# Patient Record
Sex: MALE | Race: WHITE | Employment: FULL TIME | URBAN - METROPOLITAN AREA
[De-identification: names, ages, dates, MRNs, and addresses within clinical notes are randomized per-mention and may not be internally consistent; named-entity substitution may affect disease eponyms.]

---

## 2017-04-04 ENCOUNTER — OFFICE VISIT (OUTPATIENT)
Dept: FAMILY MEDICINE CLINIC | Facility: CLINIC | Age: 57
End: 2017-04-04

## 2017-04-04 VITALS
DIASTOLIC BLOOD PRESSURE: 70 MMHG | WEIGHT: 167 LBS | BODY MASS INDEX: 24 KG/M2 | HEART RATE: 70 BPM | SYSTOLIC BLOOD PRESSURE: 108 MMHG | OXYGEN SATURATION: 97 %

## 2017-04-04 DIAGNOSIS — M25.561 ACUTE PAIN OF RIGHT KNEE: Primary | ICD-10-CM

## 2017-04-04 PROBLEM — E78.00 HYPERCHOLESTEREMIA: Status: ACTIVE | Noted: 2017-04-04

## 2017-04-04 PROCEDURE — 99213 OFFICE O/P EST LOW 20 MIN: CPT

## 2017-04-04 RX ORDER — METHYLPREDNISOLONE 4 MG/1
TABLET ORAL
Qty: 1 KIT | Refills: 0 | Status: SHIPPED | OUTPATIENT
Start: 2017-04-04 | End: 2017-06-08 | Stop reason: ALTCHOICE

## 2017-04-04 RX ORDER — NAPROXEN 500 MG/1
500 TABLET ORAL 2 TIMES DAILY WITH MEALS
Qty: 60 TABLET | Refills: 0 | Status: SHIPPED | OUTPATIENT
Start: 2017-04-04 | End: 2017-05-04

## 2017-04-04 NOTE — PATIENT INSTRUCTIONS
Cómo reducir el dolor y la hinchazón de la rodilla    Hay muchos tratamientos que pueden ayudarle a reducir el dolor y la hinchazón de la rodilla.  Eckert proveedor de [employer] o fisioterapeuta podría sugerirle linnette o más de los tratamientos siguientes:

## 2017-04-04 NOTE — PROGRESS NOTES
HPI:    Patient ID: Ayo Li is a 56year old male. Knee Pain   The pain is present in the right knee. This is a new problem. Episode onset: 2 weeks ago. There has been no history of extremity trauma. The problem occurs constantly.  The problem well-nourished. No distress. Musculoskeletal:        Right knee: He exhibits decreased range of motion (2/2 pain and crepitus present), swelling (mild), effusion (mild) and abnormal meniscus. He exhibits no erythema. Tenderness found.  Medial joint line a

## 2017-04-17 ENCOUNTER — MED REC SCAN ONLY (OUTPATIENT)
Dept: FAMILY MEDICINE CLINIC | Facility: CLINIC | Age: 57
End: 2017-04-17

## 2017-04-25 ENCOUNTER — TELEPHONE (OUTPATIENT)
Dept: FAMILY MEDICINE CLINIC | Facility: CLINIC | Age: 57
End: 2017-04-25

## 2017-04-25 NOTE — TELEPHONE ENCOUNTER
Xray results were reviewed by Dr Dany Ortega and were normal, patient wants to be refer out to Ortho.

## 2017-05-23 PROBLEM — M17.11 PRIMARY OSTEOARTHRITIS OF RIGHT KNEE: Status: ACTIVE | Noted: 2017-05-23

## 2017-05-23 PROBLEM — G89.29 CHRONIC PAIN OF RIGHT KNEE: Status: ACTIVE | Noted: 2017-05-23

## 2017-05-23 PROBLEM — M25.561 CHRONIC PAIN OF RIGHT KNEE: Status: ACTIVE | Noted: 2017-05-23

## 2017-05-23 PROBLEM — M94.269 CHONDROMALACIA, KNEE: Status: ACTIVE | Noted: 2017-05-23

## 2017-05-23 PROBLEM — M23.90 DERANGEMENT, KNEE INTERNAL: Status: ACTIVE | Noted: 2017-05-23

## 2017-06-10 PROBLEM — S83.249A ACUTE MEDIAL MENISCAL TEAR: Status: ACTIVE | Noted: 2017-06-10

## 2017-06-10 PROBLEM — Z01.818 PRE-OP TESTING: Status: ACTIVE | Noted: 2017-06-10

## 2018-01-23 ENCOUNTER — OFFICE VISIT (OUTPATIENT)
Dept: FAMILY MEDICINE CLINIC | Facility: CLINIC | Age: 58
End: 2018-01-23

## 2018-01-23 VITALS
OXYGEN SATURATION: 99 % | SYSTOLIC BLOOD PRESSURE: 132 MMHG | DIASTOLIC BLOOD PRESSURE: 86 MMHG | RESPIRATION RATE: 18 BRPM | HEART RATE: 79 BPM | HEIGHT: 68 IN | BODY MASS INDEX: 25.91 KG/M2 | WEIGHT: 171 LBS

## 2018-01-23 DIAGNOSIS — M23.203 OLD TEAR OF MEDIAL MENISCUS OF RIGHT KNEE, UNSPECIFIED TEAR TYPE: ICD-10-CM

## 2018-01-23 DIAGNOSIS — K64.8 INTERNAL HEMORRHOIDS: ICD-10-CM

## 2018-01-23 DIAGNOSIS — M94.261 CHONDROMALACIA OF RIGHT KNEE: ICD-10-CM

## 2018-01-23 DIAGNOSIS — E78.00 HYPERCHOLESTEREMIA: ICD-10-CM

## 2018-01-23 DIAGNOSIS — E66.3 OVERWEIGHT: ICD-10-CM

## 2018-01-23 DIAGNOSIS — Z13.31 DEPRESSION SCREENING: ICD-10-CM

## 2018-01-23 DIAGNOSIS — Z98.890 HISTORY OF COLONOSCOPY WITH POLYPECTOMY: ICD-10-CM

## 2018-01-23 DIAGNOSIS — G89.29 CHRONIC PAIN OF RIGHT KNEE: ICD-10-CM

## 2018-01-23 DIAGNOSIS — Z00.01 ENCOUNTER FOR ROUTINE ADULT HEALTH EXAMINATION WITH ABNORMAL FINDINGS: Primary | ICD-10-CM

## 2018-01-23 DIAGNOSIS — M17.11 PRIMARY OSTEOARTHRITIS OF RIGHT KNEE: ICD-10-CM

## 2018-01-23 DIAGNOSIS — M25.561 CHRONIC PAIN OF RIGHT KNEE: ICD-10-CM

## 2018-01-23 DIAGNOSIS — M23.91 INTERNAL DERANGEMENT OF RIGHT KNEE: ICD-10-CM

## 2018-01-23 DIAGNOSIS — H61.92 SKIN LESION OF LEFT EAR: ICD-10-CM

## 2018-01-23 DIAGNOSIS — Z86.010 HISTORY OF COLONOSCOPY WITH POLYPECTOMY: ICD-10-CM

## 2018-01-23 PROBLEM — Z01.818 PRE-OP TESTING: Status: RESOLVED | Noted: 2017-06-10 | Resolved: 2018-01-23

## 2018-01-23 PROCEDURE — 99396 PREV VISIT EST AGE 40-64: CPT

## 2018-01-23 PROCEDURE — 99212 OFFICE O/P EST SF 10 MIN: CPT

## 2018-01-24 NOTE — PATIENT INSTRUCTIONS
Pautas de prevención para hombres de 48 a 59 años de [address] [facility] de detección y las vacunas son importantes para el manejo de schofield leda.  La consejería sobre schofield leda también es esencial. A continuación, verá pautas en relación con esos temas para ho Yvette [employer] de colesterol o triglicéridos Medtronic hombres de zulema catie de edad Al menos cada erasto años   VIH Los hombres con mayor riesgo de infección; hable con schofield proveedor de atención médica En los exámenes de rutina   Cáncer de pulmón Adultos entre Haemophilus influenzae Tipo B (HIB) Los hombres con mayor riesgo de infección; hable con schofield proveedor de atención médica [place] a melany dosis   Gripe (flu) Medtronic hombres de [place] catie de [address] [facility], [address], irineo y Nina Anthony (“MMR” por imelda sig Date Last Reviewed: 3/30/2015  © 9994-5936 [employer]. [address] [facility] [place] [facility] [place] [facility] [place]. All rights reserved. This information is not intended as a substitute for professional medical care.  Always follow your healthcare professional

## 2018-01-24 NOTE — PROGRESS NOTES
CC: Annual Physical Exam    HPI:   Hussain Gonzalez is a 56year old male who presents for a complete physical exam. Pt complains of worsening skin lesion on his L ear.  He mentions that it has been present for a few years but over the last year it has go lesion. CARDIOVASCULAR:  Denies chest pain, chest pressure, chest discomfort, palpitations, edema, dyspnea on exertion or at rest.  RESPIRATORY:  Denies shortness of breath, wheezing, cough or sputum.   GASTROINTESTINAL:  Denies abdominal pain, nausea, vom gallops. LUNGS: Clear to auscultation bilterally, no rales/rhonchi/wheezing. CHEST: No tenderness. ABDOMEN:  Soft, nondistended, nontender, bowel sounds normal in all 4 quadrants, no masses, no hepatosplenomegaly. : Deferred. RECTAL: Deferred.   BACK Follow up with Dr. Farhana Wilson (Orthopedics) as scheduled. 9. Internal hemorrhoids  - Pt counseled with regards to high fiber diet intake. 10. History of colonoscopy with polypectomy  - Follow up with GI as instructed.     11. Depression screening    Dep

## 2018-01-25 ENCOUNTER — NURSE ONLY (OUTPATIENT)
Dept: FAMILY MEDICINE CLINIC | Facility: CLINIC | Age: 58
End: 2018-01-25

## 2018-01-25 DIAGNOSIS — E78.00 HYPERCHOLESTEREMIA: ICD-10-CM

## 2018-01-25 DIAGNOSIS — Z00.01 ENCOUNTER FOR ROUTINE ADULT HEALTH EXAMINATION WITH ABNORMAL FINDINGS: ICD-10-CM

## 2018-01-25 DIAGNOSIS — Z23 NEED FOR INFLUENZA VACCINATION: Primary | ICD-10-CM

## 2018-01-25 LAB
ALBUMIN SERPL BCP-MCNC: 3.9 G/DL (ref 3.5–4.8)
ALBUMIN/GLOB SERPL: 1.5 {RATIO} (ref 1–2)
ALP SERPL-CCNC: 65 U/L (ref 32–100)
ALT SERPL-CCNC: 20 U/L (ref 17–63)
ANION GAP SERPL CALC-SCNC: 9 MMOL/L (ref 0–18)
AST SERPL-CCNC: 24 U/L (ref 15–41)
BILIRUB SERPL-MCNC: 0.6 MG/DL (ref 0.3–1.2)
BILIRUB UR QL: NEGATIVE
BUN SERPL-MCNC: 13 MG/DL (ref 8–20)
BUN/CREAT SERPL: 9.6 (ref 10–20)
CALCIUM SERPL-MCNC: 9.8 MG/DL (ref 8.5–10.5)
CHLORIDE SERPL-SCNC: 107 MMOL/L (ref 95–110)
CHOLEST SERPL-MCNC: 218 MG/DL (ref 110–200)
CLARITY UR: CLEAR
CO2 SERPL-SCNC: 24 MMOL/L (ref 22–32)
COLOR UR: YELLOW
CREAT SERPL-MCNC: 1.35 MG/DL (ref 0.5–1.5)
ERYTHROCYTE [DISTWIDTH] IN BLOOD BY AUTOMATED COUNT: 13.7 % (ref 11–15)
GLOBULIN PLAS-MCNC: 2.6 G/DL (ref 2.5–3.7)
GLUCOSE SERPL-MCNC: 90 MG/DL (ref 70–99)
GLUCOSE UR-MCNC: NEGATIVE MG/DL
HCT VFR BLD AUTO: 50.3 % (ref 41–52)
HDLC SERPL-MCNC: 62 MG/DL
HGB BLD-MCNC: 16.5 G/DL (ref 13.5–17.5)
HGB UR QL STRIP.AUTO: NEGATIVE
KETONES UR-MCNC: NEGATIVE MG/DL
LDLC SERPL CALC-MCNC: 133 MG/DL (ref 0–99)
LEUKOCYTE ESTERASE UR QL STRIP.AUTO: NEGATIVE
MCH RBC QN AUTO: 32.1 PG (ref 27–32)
MCHC RBC AUTO-ENTMCNC: 32.8 G/DL (ref 32–37)
MCV RBC AUTO: 97.8 FL (ref 80–100)
NITRITE UR QL STRIP.AUTO: NEGATIVE
NONHDLC SERPL-MCNC: 156 MG/DL
OSMOLALITY UR CALC.SUM OF ELEC: 290 MOSM/KG (ref 275–295)
PH UR: 7 [PH] (ref 5–8)
PLATELET # BLD AUTO: 212 K/UL (ref 140–400)
PMV BLD AUTO: 10.2 FL (ref 7.4–10.3)
POTASSIUM SERPL-SCNC: 4.5 MMOL/L (ref 3.3–5.1)
PROT SERPL-MCNC: 6.5 G/DL (ref 5.9–8.4)
PROT UR-MCNC: NEGATIVE MG/DL
PSA SERPL-MCNC: 3.5 NG/ML (ref 0–4)
RBC # BLD AUTO: 5.14 M/UL (ref 4.5–5.9)
SODIUM SERPL-SCNC: 140 MMOL/L (ref 136–144)
SP GR UR STRIP: 1.02 (ref 1–1.03)
TRIGL SERPL-MCNC: 114 MG/DL (ref 1–149)
UROBILINOGEN UR STRIP-ACNC: <2
VIT C UR-MCNC: NEGATIVE MG/DL
WBC # BLD AUTO: 6.6 K/UL (ref 4–11)

## 2018-01-25 PROCEDURE — 90686 IIV4 VACC NO PRSV 0.5 ML IM: CPT

## 2018-01-25 PROCEDURE — 85027 COMPLETE CBC AUTOMATED: CPT

## 2018-01-25 PROCEDURE — 81003 URINALYSIS AUTO W/O SCOPE: CPT

## 2018-01-25 PROCEDURE — 90471 IMMUNIZATION ADMIN: CPT

## 2018-01-25 PROCEDURE — 36415 COLL VENOUS BLD VENIPUNCTURE: CPT

## 2018-01-25 PROCEDURE — 80061 LIPID PANEL: CPT

## 2018-01-25 PROCEDURE — 80053 COMPREHEN METABOLIC PANEL: CPT

## 2018-02-05 ENCOUNTER — TELEPHONE (OUTPATIENT)
Dept: FAMILY MEDICINE CLINIC | Facility: CLINIC | Age: 58
End: 2018-02-05

## 2018-02-05 NOTE — TELEPHONE ENCOUNTER
----- Message from Eric Medina MD sent at 1/25/2018 11:40 PM CST -----  Recommend diet/exercise and Omega 3 fish oil pills 2-4g PO daily; ok to file.

## 2018-02-06 ENCOUNTER — OFFICE VISIT (OUTPATIENT)
Dept: FAMILY MEDICINE CLINIC | Facility: CLINIC | Age: 58
End: 2018-02-06

## 2018-02-06 VITALS — SYSTOLIC BLOOD PRESSURE: 128 MMHG | OXYGEN SATURATION: 98 % | HEART RATE: 69 BPM | DIASTOLIC BLOOD PRESSURE: 70 MMHG

## 2018-02-06 DIAGNOSIS — N40.1 BENIGN PROSTATIC HYPERPLASIA WITH NOCTURIA: ICD-10-CM

## 2018-02-06 DIAGNOSIS — R35.1 BENIGN PROSTATIC HYPERPLASIA WITH NOCTURIA: ICD-10-CM

## 2018-02-06 DIAGNOSIS — E78.00 HYPERCHOLESTEREMIA: Primary | ICD-10-CM

## 2018-02-06 PROBLEM — Z00.01 ENCOUNTER FOR ROUTINE ADULT HEALTH EXAMINATION WITH ABNORMAL FINDINGS: Status: RESOLVED | Noted: 2017-06-10 | Resolved: 2018-02-06

## 2018-02-06 PROBLEM — Z13.31 DEPRESSION SCREENING: Status: RESOLVED | Noted: 2018-01-23 | Resolved: 2018-02-06

## 2018-02-06 PROCEDURE — 99213 OFFICE O/P EST LOW 20 MIN: CPT

## 2018-02-06 RX ORDER — TAMSULOSIN HYDROCHLORIDE 0.4 MG/1
0.4 CAPSULE ORAL DAILY
Qty: 90 CAPSULE | Refills: 1 | Status: SHIPPED | OUTPATIENT
Start: 2018-02-06 | End: 2018-08-14

## 2018-02-06 NOTE — PROGRESS NOTES
HPI:    Patient ID: Fausto Erickson is a 56year old male. Hyperlipidemia   This is a chronic problem. The current episode started more than 1 month ago. The problem is uncontrolled. Factors aggravating his hyperlipidemia include fatty foods.  Fazal numbness and headaches. All other systems reviewed and are negative. Current Outpatient Prescriptions:  tamsulosin HCl (FLOMAX) 0.4 MG Oral Cap Take 1 capsule (0.4 mg total) by mouth daily.  Disp: 90 capsule Rfl: 1     Allergies:No Known Aller

## 2018-02-07 NOTE — PATIENT INSTRUCTIONS
Hiperplasia benigna de próstata    La próstata es shira glándula pequeña de los hombres que genera un líquido que sale con el semen. Con el paso de ERNST/Giuliano Dey Final, la próstata Maulik de [place]. Si es [employer], puede causar problemas al orinar.  Esta afec · Ultrasonido de próstata. Linda estuido [place] ondas de grzegorz para observar el tamaño y el interior de la glándula prostática. Tratamiento de la HPB  Si usted tiene síntomas leves, es posible que no necesite tratamiento.  Usted puede ser capaz de controlar s La hiperplasia prostática benigna y el cáncer de la próstata comparten algunos síntomas. Por eso es importante que hable con schofield proveedor de atención médica acerca de los síntomas. Los hombres con HPB no son Karma Chely propensos a desarrollar zulema tipo de cáncer.

## 2018-03-07 ENCOUNTER — LAB REQUISITION (OUTPATIENT)
Dept: LAB | Facility: HOSPITAL | Age: 58
End: 2018-03-07
Payer: COMMERCIAL

## 2018-03-07 DIAGNOSIS — Z01.89 ENCOUNTER FOR OTHER SPECIFIED SPECIAL EXAMINATIONS: ICD-10-CM

## 2018-03-07 PROCEDURE — 88305 TISSUE EXAM BY PATHOLOGIST: CPT | Performed by: DERMATOLOGY

## 2018-05-07 ENCOUNTER — TELEPHONE (OUTPATIENT)
Dept: FAMILY MEDICINE CLINIC | Facility: CLINIC | Age: 58
End: 2018-05-07

## 2018-05-07 NOTE — TELEPHONE ENCOUNTER
Pt called states he previously had [facility] and was seeing Dr Toribio Magana. Pt has made the switch to [facility] and would like to know if Dr Toribio Magana is in network with his new hmo group.  If we could please call him and let him know today, pt would need a referral as

## 2018-05-07 NOTE — TELEPHONE ENCOUNTER
Patient notified, with help of MG for translation purposes, that he will need to check with his insurance if Dr. Deangelo Manning is within network.   Patient stated he lives right by Dr. Garrido Reverdenis office and will bring his card and verify with the office to see i

## 2018-05-17 ENCOUNTER — TELEPHONE (OUTPATIENT)
Dept: FAMILY MEDICINE CLINIC | Facility: CLINIC | Age: 58
End: 2018-05-17

## 2018-05-17 DIAGNOSIS — M23.91 INTERNAL DERANGEMENT OF RIGHT KNEE: Primary | ICD-10-CM

## 2018-05-17 DIAGNOSIS — S83.241D ACUTE MEDIAL MENISCUS TEAR OF RIGHT KNEE, SUBSEQUENT ENCOUNTER: ICD-10-CM

## 2018-05-17 DIAGNOSIS — M94.261 CHONDROMALACIA OF RIGHT KNEE: ICD-10-CM

## 2018-05-17 DIAGNOSIS — M17.11 PRIMARY OSTEOARTHRITIS OF RIGHT KNEE: ICD-10-CM

## 2018-05-17 NOTE — TELEPHONE ENCOUNTER
Pt called requesting a referral for Dr. Toni Miranda, has appointment scheduled on 05/31/2018 for Right knee pain. He went to see Dr. Toni Miranda back in June 2017. But [place] leave out of the [place]. Pt is hoping to get evaluated again for knee surgery.

## 2018-05-31 PROBLEM — M94.261 CHONDROMALACIA, KNEE, RIGHT: Status: ACTIVE | Noted: 2017-05-23

## 2018-05-31 PROBLEM — M23.91 DERANGEMENT, KNEE INTERNAL, RIGHT: Status: ACTIVE | Noted: 2017-05-23

## 2018-05-31 PROBLEM — S83.241D ACUTE MEDIAL MENISCAL TEAR, RIGHT, SUBSEQUENT ENCOUNTER: Status: ACTIVE | Noted: 2018-05-31

## 2018-06-09 ENCOUNTER — HOSPITAL ENCOUNTER (OUTPATIENT)
Dept: MRI IMAGING | Facility: HOSPITAL | Age: 58
Discharge: HOME | End: 2018-06-09
Attending: ORTHOPAEDIC SURGERY
Payer: COMMERCIAL

## 2018-06-09 DIAGNOSIS — M94.261 CHONDROMALACIA, KNEE, RIGHT: ICD-10-CM

## 2018-06-09 DIAGNOSIS — S83.241D ACUTE MEDIAL MENISCAL TEAR, RIGHT, SUBSEQUENT ENCOUNTER: ICD-10-CM

## 2018-06-09 DIAGNOSIS — M23.91 DERANGEMENT, KNEE INTERNAL, RIGHT: ICD-10-CM

## 2018-06-09 DIAGNOSIS — M17.11 PRIMARY OSTEOARTHRITIS OF RIGHT KNEE: ICD-10-CM

## 2018-06-09 PROCEDURE — 73721 MRI JNT OF LWR EXTRE W/O DYE: CPT | Performed by: ORTHOPAEDIC SURGERY

## 2018-06-25 ENCOUNTER — TELEPHONE (OUTPATIENT)
Dept: FAMILY MEDICINE CLINIC | Facility: CLINIC | Age: 58
End: 2018-06-25

## 2018-06-25 DIAGNOSIS — M23.91 DERANGEMENT, KNEE INTERNAL, RIGHT: Primary | ICD-10-CM

## 2018-06-28 PROBLEM — M23.300 LATERAL MENISCUS DERANGEMENT, RIGHT: Status: ACTIVE | Noted: 2018-06-28

## 2018-07-03 ENCOUNTER — APPOINTMENT (OUTPATIENT)
Dept: LAB | Facility: HOSPITAL | Age: 58
End: 2018-07-03
Attending: ORTHOPAEDIC SURGERY
Payer: COMMERCIAL

## 2018-07-03 ENCOUNTER — HOSPITAL ENCOUNTER (OUTPATIENT)
Dept: GENERAL RADIOLOGY | Facility: HOSPITAL | Age: 58
Discharge: HOME | End: 2018-07-03
Attending: ORTHOPAEDIC SURGERY
Payer: COMMERCIAL

## 2018-07-03 DIAGNOSIS — Z01.818 PRE-OP TESTING: ICD-10-CM

## 2018-07-03 LAB
ALBUMIN SERPL BCP-MCNC: 4 G/DL (ref 3.5–4.8)
ALBUMIN/GLOB SERPL: 1.5 {RATIO} (ref 1–2)
ALP SERPL-CCNC: 55 U/L (ref 32–100)
ALT SERPL-CCNC: 22 U/L (ref 17–63)
ANION GAP SERPL CALC-SCNC: 9 MMOL/L (ref 0–18)
AST SERPL-CCNC: 25 U/L (ref 15–41)
BASOPHILS # BLD: 0 K/UL (ref 0–0.2)
BASOPHILS NFR BLD: 1 %
BILIRUB SERPL-MCNC: 0.6 MG/DL (ref 0.3–1.2)
BILIRUB UR QL: NEGATIVE
BUN SERPL-MCNC: 15 MG/DL (ref 8–20)
BUN/CREAT SERPL: 9.8 (ref 10–20)
CALCIUM SERPL-MCNC: 9.7 MG/DL (ref 8.5–10.5)
CHLORIDE SERPL-SCNC: 105 MMOL/L (ref 95–110)
CLARITY UR: CLEAR
CO2 SERPL-SCNC: 25 MMOL/L (ref 22–32)
COLOR UR: YELLOW
CREAT SERPL-MCNC: 1.53 MG/DL (ref 0.5–1.5)
EOSINOPHIL # BLD: 0.1 K/UL (ref 0–0.7)
EOSINOPHIL NFR BLD: 1 %
ERYTHROCYTE [DISTWIDTH] IN BLOOD BY AUTOMATED COUNT: 13.4 % (ref 11–15)
GLOBULIN PLAS-MCNC: 2.6 G/DL (ref 2.5–3.7)
GLUCOSE SERPL-MCNC: 112 MG/DL (ref 70–99)
GLUCOSE UR-MCNC: NEGATIVE MG/DL
HCT VFR BLD AUTO: 46.1 % (ref 41–52)
HGB BLD-MCNC: 15.7 G/DL (ref 13.5–17.5)
HGB UR QL STRIP.AUTO: NEGATIVE
KETONES UR-MCNC: NEGATIVE MG/DL
LEUKOCYTE ESTERASE UR QL STRIP.AUTO: NEGATIVE
LYMPHOCYTES # BLD: 1.7 K/UL (ref 1–4)
LYMPHOCYTES NFR BLD: 22 %
MCH RBC QN AUTO: 32.6 PG (ref 27–32)
MCHC RBC AUTO-ENTMCNC: 34 G/DL (ref 32–37)
MCV RBC AUTO: 95.9 FL (ref 80–100)
MONOCYTES # BLD: 0.5 K/UL (ref 0–1)
MONOCYTES NFR BLD: 7 %
NEUTROPHILS # BLD AUTO: 5.5 K/UL (ref 1.8–7.7)
NEUTROPHILS NFR BLD: 70 %
NITRITE UR QL STRIP.AUTO: NEGATIVE
OSMOLALITY UR CALC.SUM OF ELEC: 290 MOSM/KG (ref 275–295)
PATIENT FASTING: NO
PH UR: 5 [PH] (ref 5–8)
PLATELET # BLD AUTO: 172 K/UL (ref 140–400)
PMV BLD AUTO: 10 FL (ref 7.4–10.3)
POTASSIUM SERPL-SCNC: 4 MMOL/L (ref 3.3–5.1)
PROT SERPL-MCNC: 6.6 G/DL (ref 5.9–8.4)
PROT UR-MCNC: 30 MG/DL
RBC # BLD AUTO: 4.81 M/UL (ref 4.5–5.9)
RBC #/AREA URNS AUTO: 1 /HPF
SODIUM SERPL-SCNC: 139 MMOL/L (ref 136–144)
SP GR UR STRIP: 1.03 (ref 1–1.03)
UROBILINOGEN UR STRIP-ACNC: 2
VIT C UR-MCNC: NEGATIVE MG/DL
WBC # BLD AUTO: 7.8 K/UL (ref 4–11)
WBC #/AREA URNS AUTO: 1 /HPF

## 2018-07-03 PROCEDURE — 93005 ELECTROCARDIOGRAM TRACING: CPT

## 2018-07-03 PROCEDURE — 93010 ELECTROCARDIOGRAM REPORT: CPT | Performed by: ORTHOPAEDIC SURGERY

## 2018-07-03 PROCEDURE — 36415 COLL VENOUS BLD VENIPUNCTURE: CPT

## 2018-07-03 PROCEDURE — 81001 URINALYSIS AUTO W/SCOPE: CPT

## 2018-07-03 PROCEDURE — 85025 COMPLETE CBC W/AUTO DIFF WBC: CPT

## 2018-07-03 PROCEDURE — 80053 COMPREHEN METABOLIC PANEL: CPT

## 2018-07-03 PROCEDURE — 71046 X-RAY EXAM CHEST 2 VIEWS: CPT | Performed by: ORTHOPAEDIC SURGERY

## 2018-07-11 ENCOUNTER — TELEPHONE (OUTPATIENT)
Dept: FAMILY MEDICINE CLINIC | Facility: CLINIC | Age: 58
End: 2018-07-11

## 2018-07-13 ENCOUNTER — ANESTHESIA (OUTPATIENT)
Dept: SURGERY | Facility: HOSPITAL | Age: 58
End: 2018-07-13
Payer: COMMERCIAL

## 2018-07-13 ENCOUNTER — SURGERY (OUTPATIENT)
Age: 58
End: 2018-07-13

## 2018-07-13 ENCOUNTER — ANESTHESIA EVENT (OUTPATIENT)
Dept: SURGERY | Facility: HOSPITAL | Age: 58
End: 2018-07-13
Payer: COMMERCIAL

## 2018-07-13 ENCOUNTER — HOSPITAL ENCOUNTER (OUTPATIENT)
Facility: HOSPITAL | Age: 58
Setting detail: HOSPITAL OUTPATIENT SURGERY
Discharge: HOME | End: 2018-07-13
Attending: ORTHOPAEDIC SURGERY | Admitting: ORTHOPAEDIC SURGERY
Payer: COMMERCIAL

## 2018-07-13 VITALS
RESPIRATION RATE: 16 BRPM | HEIGHT: 69 IN | OXYGEN SATURATION: 100 % | BODY MASS INDEX: 25.48 KG/M2 | TEMPERATURE: 98 F | WEIGHT: 172 LBS | SYSTOLIC BLOOD PRESSURE: 128 MMHG | DIASTOLIC BLOOD PRESSURE: 84 MMHG | HEART RATE: 66 BPM

## 2018-07-13 PROCEDURE — 88304 TISSUE EXAM BY PATHOLOGIST: CPT | Performed by: ORTHOPAEDIC SURGERY

## 2018-07-13 PROCEDURE — 0SBC4ZZ EXCISION OF RIGHT KNEE JOINT, PERCUTANEOUS ENDOSCOPIC APPROACH: ICD-10-PCS | Performed by: ORTHOPAEDIC SURGERY

## 2018-07-13 PROCEDURE — 94010 BREATHING CAPACITY TEST: CPT | Performed by: ORTHOPAEDIC SURGERY

## 2018-07-13 RX ORDER — NALOXONE HYDROCHLORIDE 0.4 MG/ML
80 INJECTION, SOLUTION INTRAMUSCULAR; INTRAVENOUS; SUBCUTANEOUS AS NEEDED
Status: DISCONTINUED | OUTPATIENT
Start: 2018-07-13 | End: 2018-07-13

## 2018-07-13 RX ORDER — ONDANSETRON 2 MG/ML
4 INJECTION INTRAMUSCULAR; INTRAVENOUS EVERY 6 HOURS PRN
Status: DISCONTINUED | OUTPATIENT
Start: 2018-07-13 | End: 2018-07-13

## 2018-07-13 RX ORDER — HYDROMORPHONE HYDROCHLORIDE 1 MG/ML
0.2 INJECTION, SOLUTION INTRAMUSCULAR; INTRAVENOUS; SUBCUTANEOUS EVERY 5 MIN PRN
Status: DISCONTINUED | OUTPATIENT
Start: 2018-07-13 | End: 2018-07-13

## 2018-07-13 RX ORDER — MELOXICAM 15 MG/1
15 TABLET ORAL
Qty: 35 TABLET | Refills: 3 | Status: SHIPPED | OUTPATIENT
Start: 2018-07-13 | End: 2019-04-05

## 2018-07-13 RX ORDER — HYDROMORPHONE HYDROCHLORIDE 1 MG/ML
0.4 INJECTION, SOLUTION INTRAMUSCULAR; INTRAVENOUS; SUBCUTANEOUS EVERY 5 MIN PRN
Status: DISCONTINUED | OUTPATIENT
Start: 2018-07-13 | End: 2018-07-13

## 2018-07-13 RX ORDER — SODIUM CHLORIDE, SODIUM LACTATE, POTASSIUM CHLORIDE, CALCIUM CHLORIDE 600; 310; 30; 20 MG/100ML; MG/100ML; MG/100ML; MG/100ML
INJECTION, SOLUTION INTRAVENOUS CONTINUOUS
Status: DISCONTINUED | OUTPATIENT
Start: 2018-07-13 | End: 2018-07-13

## 2018-07-13 RX ORDER — KETOROLAC TROMETHAMINE 30 MG/ML
INJECTION, SOLUTION INTRAMUSCULAR; INTRAVENOUS AS NEEDED
Status: DISCONTINUED | OUTPATIENT
Start: 2018-07-13 | End: 2018-07-13 | Stop reason: SURG

## 2018-07-13 RX ORDER — HYDROCODONE BITARTRATE AND ACETAMINOPHEN 5; 325 MG/1; MG/1
1 TABLET ORAL AS NEEDED
Status: DISCONTINUED | OUTPATIENT
Start: 2018-07-13 | End: 2018-07-13

## 2018-07-13 RX ORDER — HYDROCODONE BITARTRATE AND ACETAMINOPHEN 5; 325 MG/1; MG/1
2 TABLET ORAL EVERY 4 HOURS PRN
Status: DISCONTINUED | OUTPATIENT
Start: 2018-07-13 | End: 2018-07-13

## 2018-07-13 RX ORDER — DEXAMETHASONE SODIUM PHOSPHATE 4 MG/ML
VIAL (ML) INJECTION AS NEEDED
Status: DISCONTINUED | OUTPATIENT
Start: 2018-07-13 | End: 2018-07-13 | Stop reason: SURG

## 2018-07-13 RX ORDER — HYDROMORPHONE HYDROCHLORIDE 1 MG/ML
0.6 INJECTION, SOLUTION INTRAMUSCULAR; INTRAVENOUS; SUBCUTANEOUS EVERY 5 MIN PRN
Status: DISCONTINUED | OUTPATIENT
Start: 2018-07-13 | End: 2018-07-13

## 2018-07-13 RX ORDER — ONDANSETRON 2 MG/ML
INJECTION INTRAMUSCULAR; INTRAVENOUS AS NEEDED
Status: DISCONTINUED | OUTPATIENT
Start: 2018-07-13 | End: 2018-07-13 | Stop reason: SURG

## 2018-07-13 RX ORDER — HYDROCODONE BITARTRATE AND ACETAMINOPHEN 5; 325 MG/1; MG/1
1 TABLET ORAL EVERY 4 HOURS PRN
Status: DISCONTINUED | OUTPATIENT
Start: 2018-07-13 | End: 2018-07-13

## 2018-07-13 RX ORDER — ONDANSETRON 2 MG/ML
4 INJECTION INTRAMUSCULAR; INTRAVENOUS ONCE AS NEEDED
Status: DISCONTINUED | OUTPATIENT
Start: 2018-07-13 | End: 2018-07-13

## 2018-07-13 RX ORDER — BUPIVACAINE HYDROCHLORIDE AND EPINEPHRINE 2.5; 5 MG/ML; UG/ML
INJECTION, SOLUTION INFILTRATION; PERINEURAL AS NEEDED
Status: DISCONTINUED | OUTPATIENT
Start: 2018-07-13 | End: 2018-07-13 | Stop reason: HOSPADM

## 2018-07-13 RX ORDER — HYDROCODONE BITARTRATE AND ACETAMINOPHEN 7.5; 325 MG/1; MG/1
1 TABLET ORAL EVERY 6 HOURS PRN
Qty: 45 TABLET | Refills: 0 | Status: SHIPPED | OUTPATIENT
Start: 2018-07-13 | End: 2019-04-05

## 2018-07-13 RX ORDER — LIDOCAINE HYDROCHLORIDE 10 MG/ML
INJECTION, SOLUTION EPIDURAL; INFILTRATION; INTRACAUDAL; PERINEURAL AS NEEDED
Status: DISCONTINUED | OUTPATIENT
Start: 2018-07-13 | End: 2018-07-13 | Stop reason: SURG

## 2018-07-13 RX ORDER — CEFAZOLIN SODIUM/WATER 2 G/20 ML
2 SYRINGE (ML) INTRAVENOUS ONCE
Status: COMPLETED | OUTPATIENT
Start: 2018-07-13 | End: 2018-07-13

## 2018-07-13 RX ORDER — FAMOTIDINE 20 MG/1
20 TABLET ORAL ONCE
Status: DISCONTINUED | OUTPATIENT
Start: 2018-07-13 | End: 2018-07-13 | Stop reason: HOSPADM

## 2018-07-13 RX ORDER — HYDROCODONE BITARTRATE AND ACETAMINOPHEN 5; 325 MG/1; MG/1
2 TABLET ORAL AS NEEDED
Status: DISCONTINUED | OUTPATIENT
Start: 2018-07-13 | End: 2018-07-13

## 2018-07-13 RX ORDER — ACETAMINOPHEN 325 MG/1
650 TABLET ORAL EVERY 4 HOURS PRN
Status: DISCONTINUED | OUTPATIENT
Start: 2018-07-13 | End: 2018-07-13

## 2018-07-13 RX ORDER — METOCLOPRAMIDE 10 MG/1
10 TABLET ORAL ONCE
Status: DISCONTINUED | OUTPATIENT
Start: 2018-07-13 | End: 2018-07-13 | Stop reason: HOSPADM

## 2018-07-13 RX ORDER — ACETAMINOPHEN 500 MG
1000 TABLET ORAL ONCE
Status: COMPLETED | OUTPATIENT
Start: 2018-07-13 | End: 2018-07-13

## 2018-07-13 RX ORDER — MIDAZOLAM HYDROCHLORIDE 1 MG/ML
INJECTION INTRAMUSCULAR; INTRAVENOUS AS NEEDED
Status: DISCONTINUED | OUTPATIENT
Start: 2018-07-13 | End: 2018-07-13 | Stop reason: SURG

## 2018-07-13 RX ADMIN — DEXAMETHASONE SODIUM PHOSPHATE 4 MG: 4 MG/ML VIAL (ML) INJECTION at 08:10:00

## 2018-07-13 RX ADMIN — ONDANSETRON 4 MG: 2 INJECTION INTRAMUSCULAR; INTRAVENOUS at 08:10:00

## 2018-07-13 RX ADMIN — SODIUM CHLORIDE, SODIUM LACTATE, POTASSIUM CHLORIDE, CALCIUM CHLORIDE: 600; 310; 30; 20 INJECTION, SOLUTION INTRAVENOUS at 07:46:00

## 2018-07-13 RX ADMIN — LIDOCAINE HYDROCHLORIDE 100 MG: 10 INJECTION, SOLUTION EPIDURAL; INFILTRATION; INTRACAUDAL; PERINEURAL at 07:52:00

## 2018-07-13 RX ADMIN — KETOROLAC TROMETHAMINE 30 MG: 30 INJECTION, SOLUTION INTRAMUSCULAR; INTRAVENOUS at 09:16:00

## 2018-07-13 RX ADMIN — MIDAZOLAM HYDROCHLORIDE 2 MG: 1 INJECTION INTRAMUSCULAR; INTRAVENOUS at 07:51:00

## 2018-07-13 RX ADMIN — CEFAZOLIN SODIUM/WATER 2 G: 2 G/20 ML SYRINGE (ML) INTRAVENOUS at 08:05:00

## 2018-07-13 RX ADMIN — SODIUM CHLORIDE, SODIUM LACTATE, POTASSIUM CHLORIDE, CALCIUM CHLORIDE: 600; 310; 30; 20 INJECTION, SOLUTION INTRAVENOUS at 09:05:00

## 2018-07-13 NOTE — ANESTHESIA PROCEDURE NOTES
Airway  Urgency: elective      General Information and Staff    Patient location during procedure: OR  Anesthesiologist: Dyllan Parnell  Resident/CRNA: JEANIE CASTANEDA  Performed: CRNA     Indications and Patient Condition  Indications for airway management

## 2018-07-13 NOTE — ANESTHESIA POSTPROCEDURE EVALUATION
Patient: Fredy Julian    Procedure Summary     Date:  07/13/18 Room / Location:  [address] [place] [address] MAIN OR 04 / [facility] MAIN OR    Anesthesia Start:  0648 Anesthesia Stop:  2750    Procedure:  KNEE ARTHROSCOPY (Right Knee) Diagnosis:  (Medial meniscus tear, internal d

## 2018-07-13 NOTE — BRIEF OP NOTE
[facility] OPERATING ROOM   Brief Op Note    Patients Name: Zahra Murray  Attending Physician: Miranda Bryan MD  Operating Physician: Romina Strong MD  CSN: [id number]     Location:  OR  MRN: [id number]    YOB: 1960  Admi

## 2018-07-13 NOTE — ANESTHESIA PREPROCEDURE EVALUATION
Anesthesia PreOp Note    HPI:     Melinda Kothari is a 66year old male who presents for preoperative consultation requested by: Christi Lakhani MD    Date of Surgery: 7/13/2018    Procedure(s):  KNEE ARTHROSCOPY  Indication: Medial meniscus tear, in Not Taking       Current Facility-Administered Medications Ordered in Epic:  lactated ringers infusion  Intravenous Continuous Jasmeet Carrera MD Last Rate: 20 mL/hr at 07/13/18 0643   famoTIDine (PEPCID) tab 20 mg 20 mg Oral Once Jasmeet Carrera MD pressure is 134/85 and his pulse is 65. His respiration is 16 and oxygen saturation is 99%.     07/09/18  1545 07/13/18  0624   BP:  134/85   BP Location:  Right arm   Pulse:  65   Resp:  16   Temp:  98 °F (36.7 °C)   TempSrc:  Oral   SpO2:  99%   Weight: 8

## 2018-07-13 NOTE — OPERATIVE REPORT
[facility]    PATIENT'S NAME: Anurag Tiffany   ATTENDING PHYSICIAN: Bonnie Busby MD   OPERATING PHYSICIAN: Bonnie Busby MD   PATIENT ACCOUNT#:   [de-identified]    LOCATION:  [place] [address]  MEDICAL RECORD #:   [id number] tricompartmental osteoarthritic changes observed as well.   Conservative treatment was recommended initially, but his symptoms have persisted associated with mechanical symptoms exacerbated by activities and ameliorated by rest.  As per the patient's reques needle for localization in the medial compartment. Investigation of the suprapatellar pouch demonstrated chondromalacia/DJD changes, grade 3. There was some reactive synovitis seen. No intraarticular loose bodies were seen in the suprapatellar pouch.   Gaby Bahena effects from the anesthetic agent or procedure itself. The needle and sponge counts were correct.     Dictated By Narciso James MD  d: 07/13/2018 11:04:32  t: 07/13/2018 12:17:16  [facility] [id number]  [facility]/

## 2018-07-13 NOTE — H&P
[]Manual[]Template  []Copied  HPI:   Beauty Row:  Rafal Brownlee was seen today with his brother-in-law who assisted in translation. [facility] presents is a very active 51-year-old male's been experiencing a 2 year history of right knee pain associated with s PHYSICAL EXAM:   The patient is alert, oriented, and appropriate throughout the examination and no apparent distress.   Pupils equal round react to light and accommodation; extraocular muscles intact  Neck supple without masses, bruits, nor increased JVD  L IMAGING: X-RAYS: RIGHT KNEE WEIGHTBEARING AP, LATERAL, PATELLA SKYLINE VIEW: Osteoarthritic changes are noted tricompartmentally but no acute bone changes are seen.  Correlation with MRI scan if clinically indicated. . Details of x-rays shown and explained BONE MARROW:          There is mild amount of increased marrow edema is seen along the anterior margin of the medial tibial plateau with subtle irregularity of the articular surface suggestive of stress changes.  No acute fracture, dislocation or marrow rep The clinical and diagnostic findings were reviewed in great detail with the patient using an anatomic illustration/3D model.  We discussed the benefits, risks, and all the potential complications of the various treatment options including continuation of li activities. Numerous questions were asked, all of which were answered to the best of my abilities, and it was felt that informed consent had been obtained.  The patient was told if there was a good understanding of the above discussion and the patient feels

## 2018-07-19 ENCOUNTER — TELEPHONE (OUTPATIENT)
Dept: PHYSICAL THERAPY | Facility: HOSPITAL | Age: 58
End: 2018-07-19

## 2018-07-19 PROBLEM — S83.271A COMPLEX TEAR OF LATERAL MENISCUS OF RIGHT KNEE AS CURRENT INJURY: Status: ACTIVE | Noted: 2018-07-19

## 2018-07-20 ENCOUNTER — TELEPHONE (OUTPATIENT)
Dept: PHYSICAL THERAPY | Facility: HOSPITAL | Age: 58
End: 2018-07-20

## 2018-07-23 ENCOUNTER — OFFICE VISIT (OUTPATIENT)
Dept: PHYSICAL THERAPY | Facility: HOSPITAL | Age: 58
End: 2018-07-23
Attending: ORTHOPAEDIC SURGERY
Payer: COMMERCIAL

## 2018-07-23 DIAGNOSIS — M23.91 DERANGEMENT, KNEE INTERNAL, RIGHT: ICD-10-CM

## 2018-07-23 DIAGNOSIS — S83.271D COMPLEX TEAR OF LATERAL MENISCUS OF RIGHT KNEE AS CURRENT INJURY, SUBSEQUENT ENCOUNTER: ICD-10-CM

## 2018-07-23 DIAGNOSIS — M17.11 PRIMARY OSTEOARTHRITIS OF RIGHT KNEE: ICD-10-CM

## 2018-07-23 DIAGNOSIS — S83.241D ACUTE MEDIAL MENISCAL TEAR, RIGHT, SUBSEQUENT ENCOUNTER: ICD-10-CM

## 2018-07-23 DIAGNOSIS — M94.261 CHONDROMALACIA, KNEE, RIGHT: ICD-10-CM

## 2018-07-23 PROCEDURE — 97161 PT EVAL LOW COMPLEX 20 MIN: CPT | Performed by: PHYSICAL THERAPIST

## 2018-07-23 PROCEDURE — 97110 THERAPEUTIC EXERCISES: CPT | Performed by: PHYSICAL THERAPIST

## 2018-07-23 NOTE — PROGRESS NOTES
LOWER EXTREMITY EVALUATION:   Referring Physician: Dr. Velvet Marc  Date of Onset: 2 years ago, surgery 6/13/18 Date of Service: 7/23/2018   Diagnosis: medial and lateral meniscal repair  PATIENT SUMMARY:   Malvin Stout is a 66year old y/o male who pr Charges: PT Eval low complexity, TE1      Total Timed Treatment: 45 min     Total Treatment Time: 48 min         PLAN OF CARE:    Goals:    1) restore R knee ROM 0-140 degrees to allow normal gait and stair climbing.   2) restore R knee strength 5/5 to allo

## 2018-07-24 ENCOUNTER — OFFICE VISIT (OUTPATIENT)
Dept: PHYSICAL THERAPY | Facility: HOSPITAL | Age: 58
End: 2018-07-24
Attending: ORTHOPAEDIC SURGERY
Payer: COMMERCIAL

## 2018-07-24 PROCEDURE — 97110 THERAPEUTIC EXERCISES: CPT

## 2018-07-24 PROCEDURE — 97530 THERAPEUTIC ACTIVITIES: CPT

## 2018-07-24 NOTE — PROGRESS NOTES
Diagnosis: Derangement, knee internal, right (M23.91)  Acute medial meniscal tear, right, subsequent encounter (S83.241D)  Complex tear of lateral meniscus of right knee as current injury, subsequent encounter (S83.271D)  Chondromalacia, knee, right (M94.2

## 2018-07-25 ENCOUNTER — APPOINTMENT (OUTPATIENT)
Dept: PHYSICAL THERAPY | Facility: HOSPITAL | Age: 58
End: 2018-07-25
Attending: ORTHOPAEDIC SURGERY
Payer: COMMERCIAL

## 2018-07-26 ENCOUNTER — OFFICE VISIT (OUTPATIENT)
Dept: PHYSICAL THERAPY | Facility: HOSPITAL | Age: 58
End: 2018-07-26
Attending: ORTHOPAEDIC SURGERY
Payer: COMMERCIAL

## 2018-07-26 PROCEDURE — 97110 THERAPEUTIC EXERCISES: CPT

## 2018-07-26 PROCEDURE — 97530 THERAPEUTIC ACTIVITIES: CPT

## 2018-07-26 NOTE — PROGRESS NOTES
Diagnosis: Derangement, knee internal, right (M23.91)  Acute medial meniscal tear, right, subsequent encounter (S83.241D)  Complex tear of lateral meniscus of right knee as current injury, subsequent encounter (S83.271D)  Chondromalacia, knee, right (M94.2 able to return to pain free standing, walking and stairs to allow return to working full duty. Plan:  Continue with close chain exercises as appropriate. Progress to 6\" step if able. Skilled Services: Therapeutic exercises    Charges:  TherEx2, TAx1

## 2018-07-30 ENCOUNTER — OFFICE VISIT (OUTPATIENT)
Dept: PHYSICAL THERAPY | Facility: HOSPITAL | Age: 58
End: 2018-07-30
Attending: ORTHOPAEDIC SURGERY
Payer: COMMERCIAL

## 2018-07-30 PROCEDURE — 97530 THERAPEUTIC ACTIVITIES: CPT | Performed by: PHYSICAL THERAPIST

## 2018-07-30 PROCEDURE — 97110 THERAPEUTIC EXERCISES: CPT | Performed by: PHYSICAL THERAPIST

## 2018-08-01 ENCOUNTER — APPOINTMENT (OUTPATIENT)
Dept: PHYSICAL THERAPY | Age: 58
End: 2018-08-01
Payer: COMMERCIAL

## 2018-08-01 ENCOUNTER — APPOINTMENT (OUTPATIENT)
Dept: PHYSICAL THERAPY | Facility: HOSPITAL | Age: 58
End: 2018-08-01
Attending: ORTHOPAEDIC SURGERY
Payer: COMMERCIAL

## 2018-08-02 ENCOUNTER — OFFICE VISIT (OUTPATIENT)
Dept: PHYSICAL THERAPY | Facility: HOSPITAL | Age: 58
End: 2018-08-02
Attending: ORTHOPAEDIC SURGERY
Payer: COMMERCIAL

## 2018-08-02 PROCEDURE — 97530 THERAPEUTIC ACTIVITIES: CPT

## 2018-08-02 PROCEDURE — 97110 THERAPEUTIC EXERCISES: CPT

## 2018-08-02 NOTE — PROGRESS NOTES
Diagnosis: Derangement, knee internal, right (M23.91)  Acute medial meniscal tear, right, subsequent encounter (S83.241D)  Complex tear of lateral meniscus of right knee as current injury, subsequent encounter (S83.271D)  Chondromalacia, knee, right (M94.2 return to pain free standing, walking and stairs to allow return to working full duty. Plan:  Continue to PT for right quad strengthening and stability exercises to improved functional activity. Skilled Services: Therapeutic exercises    Charges:  Ilda Mauricio

## 2018-08-06 ENCOUNTER — APPOINTMENT (OUTPATIENT)
Dept: PHYSICAL THERAPY | Facility: HOSPITAL | Age: 58
End: 2018-08-06
Attending: ORTHOPAEDIC SURGERY
Payer: COMMERCIAL

## 2018-08-07 ENCOUNTER — OFFICE VISIT (OUTPATIENT)
Dept: PHYSICAL THERAPY | Facility: HOSPITAL | Age: 58
End: 2018-08-07
Payer: COMMERCIAL

## 2018-08-07 PROCEDURE — 97110 THERAPEUTIC EXERCISES: CPT | Performed by: PHYSICAL THERAPIST

## 2018-08-07 PROCEDURE — 97530 THERAPEUTIC ACTIVITIES: CPT | Performed by: PHYSICAL THERAPIST

## 2018-08-07 NOTE — PROGRESS NOTES
Diagnosis: Derangement, knee internal, right (M23.91)  Acute medial meniscal tear, right, subsequent encounter (S83.241D)  Complex tear of lateral meniscus of right knee as current injury, subsequent encounter (S83.271D)  Chondromalacia, knee, right (M94.2 balance 30 seconds bilaterally to minimize fall risk. 4) able to return to pain free standing, walking and stairs to allow return to working full duty.     Plan: pt to cont HEP, cont hourly active self extension/quad set x 5 min, gradually increase walking

## 2018-08-08 ENCOUNTER — NURSE ONLY (OUTPATIENT)
Dept: FAMILY MEDICINE CLINIC | Facility: CLINIC | Age: 58
End: 2018-08-08

## 2018-08-08 ENCOUNTER — APPOINTMENT (OUTPATIENT)
Dept: PHYSICAL THERAPY | Facility: HOSPITAL | Age: 58
End: 2018-08-08
Attending: ORTHOPAEDIC SURGERY
Payer: COMMERCIAL

## 2018-08-08 DIAGNOSIS — E78.00 HYPERCHOLESTEREMIA: ICD-10-CM

## 2018-08-08 LAB
ALBUMIN SERPL BCP-MCNC: 3.9 G/DL (ref 3.5–4.8)
ALBUMIN/GLOB SERPL: 1.4 {RATIO} (ref 1–2)
ALP SERPL-CCNC: 61 U/L (ref 32–100)
ALT SERPL-CCNC: 27 U/L (ref 17–63)
ANION GAP SERPL CALC-SCNC: 6 MMOL/L (ref 0–18)
AST SERPL-CCNC: 22 U/L (ref 15–41)
BILIRUB SERPL-MCNC: 0.9 MG/DL (ref 0.3–1.2)
BUN SERPL-MCNC: 15 MG/DL (ref 8–20)
BUN/CREAT SERPL: 10.8 (ref 10–20)
CALCIUM SERPL-MCNC: 9.8 MG/DL (ref 8.5–10.5)
CHLORIDE SERPL-SCNC: 107 MMOL/L (ref 95–110)
CHOLEST SERPL-MCNC: 172 MG/DL (ref 110–200)
CO2 SERPL-SCNC: 27 MMOL/L (ref 22–32)
CREAT SERPL-MCNC: 1.39 MG/DL (ref 0.5–1.5)
GLOBULIN PLAS-MCNC: 2.7 G/DL (ref 2.5–3.7)
GLUCOSE SERPL-MCNC: 85 MG/DL (ref 70–99)
HDLC SERPL-MCNC: 50 MG/DL
LDLC SERPL CALC-MCNC: 105 MG/DL (ref 0–99)
NONHDLC SERPL-MCNC: 122 MG/DL
OSMOLALITY UR CALC.SUM OF ELEC: 290 MOSM/KG (ref 275–295)
PATIENT FASTING: YES
POTASSIUM SERPL-SCNC: 4.2 MMOL/L (ref 3.3–5.1)
PROT SERPL-MCNC: 6.6 G/DL (ref 5.9–8.4)
SODIUM SERPL-SCNC: 140 MMOL/L (ref 136–144)
TRIGL SERPL-MCNC: 84 MG/DL (ref 1–149)

## 2018-08-08 PROCEDURE — 36415 COLL VENOUS BLD VENIPUNCTURE: CPT

## 2018-08-08 PROCEDURE — 80061 LIPID PANEL: CPT

## 2018-08-08 PROCEDURE — 97530 THERAPEUTIC ACTIVITIES: CPT

## 2018-08-08 PROCEDURE — 97110 THERAPEUTIC EXERCISES: CPT

## 2018-08-08 PROCEDURE — 80053 COMPREHEN METABOLIC PANEL: CPT

## 2018-08-08 NOTE — PROGRESS NOTES
Diagnosis: Derangement, knee internal, right (M23.91)  Acute medial meniscal tear, right, subsequent encounter (S83.241D)  Complex tear of lateral meniscus of right knee as current injury, subsequent encounter (S83.271D)  Chondromalacia, knee, right (M94.2 activity to improved knee strength and stability. Patient c/o no increased pain or discomfort after treatment. Goals:   1) restore R knee ROM 0-140 degrees to allow normal gait and stair climbing.   2) restore R knee strength 5/5 to allow functional a

## 2018-08-13 ENCOUNTER — OFFICE VISIT (OUTPATIENT)
Dept: PHYSICAL THERAPY | Facility: HOSPITAL | Age: 58
End: 2018-08-13
Attending: ORTHOPAEDIC SURGERY
Payer: COMMERCIAL

## 2018-08-13 PROCEDURE — 97530 THERAPEUTIC ACTIVITIES: CPT | Performed by: PHYSICAL THERAPIST

## 2018-08-13 PROCEDURE — 97110 THERAPEUTIC EXERCISES: CPT | Performed by: PHYSICAL THERAPIST

## 2018-08-13 NOTE — PROGRESS NOTES
Diagnosis: Derangement, knee internal, right (M23.91)  Acute medial meniscal tear, right, subsequent encounter (S83.241D)  Complex tear of lateral meniscus of right knee as current injury, subsequent encounter (S83.271D)  Chondromalacia, knee, right (M94.2 min balltoss w SLS x 3 min, then weightshift x 2 min Rebounder   Airex catch/throug  2# MB 3 x 20   -      TG squats, level 8 x 50 TG squats, level 8 x 50 TG squats L-8 x3min TG squats, level 8 x 50       Supine quad sets x 30, 5\" hold Supine QS  30 x 5 s

## 2018-08-14 ENCOUNTER — OFFICE VISIT (OUTPATIENT)
Dept: FAMILY MEDICINE CLINIC | Facility: CLINIC | Age: 58
End: 2018-08-14

## 2018-08-14 ENCOUNTER — OFFICE VISIT (OUTPATIENT)
Dept: PHYSICAL THERAPY | Facility: HOSPITAL | Age: 58
End: 2018-08-14
Payer: COMMERCIAL

## 2018-08-14 VITALS
WEIGHT: 169 LBS | HEIGHT: 68 IN | DIASTOLIC BLOOD PRESSURE: 70 MMHG | BODY MASS INDEX: 25.61 KG/M2 | OXYGEN SATURATION: 98 % | SYSTOLIC BLOOD PRESSURE: 116 MMHG | HEART RATE: 87 BPM

## 2018-08-14 DIAGNOSIS — R35.1 BENIGN PROSTATIC HYPERPLASIA WITH NOCTURIA: ICD-10-CM

## 2018-08-14 DIAGNOSIS — Z01.89 ENCOUNTER FOR ROUTINE LABORATORY TESTING: ICD-10-CM

## 2018-08-14 DIAGNOSIS — N40.1 BENIGN PROSTATIC HYPERPLASIA WITH NOCTURIA: ICD-10-CM

## 2018-08-14 DIAGNOSIS — E78.00 HYPERCHOLESTEREMIA: Primary | ICD-10-CM

## 2018-08-14 PROBLEM — Z01.818 PRE-OP TESTING: Status: RESOLVED | Noted: 2017-06-10 | Resolved: 2018-08-14

## 2018-08-14 PROCEDURE — 97530 THERAPEUTIC ACTIVITIES: CPT | Performed by: PHYSICAL THERAPIST

## 2018-08-14 PROCEDURE — 97110 THERAPEUTIC EXERCISES: CPT | Performed by: PHYSICAL THERAPIST

## 2018-08-14 PROCEDURE — 99213 OFFICE O/P EST LOW 20 MIN: CPT

## 2018-08-14 RX ORDER — TAMSULOSIN HYDROCHLORIDE 0.4 MG/1
0.4 CAPSULE ORAL DAILY
Qty: 90 CAPSULE | Refills: 1 | Status: SHIPPED | OUTPATIENT
Start: 2018-08-14 | End: 2019-04-01

## 2018-08-14 RX ORDER — OMEGA-3-ACID ETHYL ESTERS 1 G/1
2 CAPSULE, LIQUID FILLED ORAL DAILY
COMMUNITY
End: 2019-04-01

## 2018-08-14 NOTE — PROGRESS NOTES
HPI:    Patient ID: Brad Jalloh is a 66year old male. Hyperlipidemia   This is a chronic problem. The current episode started more than 1 month ago. The problem is uncontrolled. Factors aggravating his hyperlipidemia include fatty foods.  Fazal mg total) by mouth daily with food. Disp: 35 tablet Rfl: 3     Allergies:No Known Allergies   PHYSICAL EXAM:   Physical Exam   Constitutional: He is oriented to person, place, and time. He appears well-developed and well-nourished. No distress.    Leandrew Chimes

## 2018-08-14 NOTE — PROGRESS NOTES
Diagnosis: Derangement, knee internal, right (M23.91)  Acute medial meniscal tear, right, subsequent encounter (S83.241D)  Complex tear of lateral meniscus of right knee as current injury, subsequent encounter (S83.271D)  Chondromalacia, knee, right (M94.2 eccentric   6inch step x 20 lateral step over 6\" step x 50 lateral step over 6\" step x 50     balltoss w SLS x 3 min, then weightshift x 2 min balltoss w SLS x 3 min, then weightshift x 2 min Rebounder   Airex catch/throug  2# MB 3 x 20   - balltoss w SL

## 2018-08-15 ENCOUNTER — APPOINTMENT (OUTPATIENT)
Dept: PHYSICAL THERAPY | Facility: HOSPITAL | Age: 58
End: 2018-08-15
Attending: ORTHOPAEDIC SURGERY
Payer: COMMERCIAL

## 2018-08-15 NOTE — PATIENT INSTRUCTIONS
Cambios del estilo de christ para controlar el colesterol  Seguir shira dieta [address] luc, hacer ejercicio, controlar schofield peso, dejar de fumar, manejar imelda niveles de estrés y emma los medicamentos necesarios son todas cosas que pueden ayudarle a controlar schofield n Schofield proveedor de [facility] [facility] puede recomendarle que tiffanie más actividad física si no dasilva estado haciendo ejercicio.  Según cuál sea schofield situación, puede que schofield proveedor le recomiende hacer actividad física de nivel entre moderado e intenso brook al [employer] Aprenda técnicas para manejar el estrés que le ayuden a lidiar con el estrés presente en schofield christ laboral y personal. [place] puede disminuir en gran medida schofield rriesgo de desarrollar shira enfermedad cardiovascular.   Cómo aprovechar mejor los [facility] [employer] · Adultos que reyes tenido un ataque cardíaco o cerebral, O adultos que reyes tenido shira enfermedad vascular periférica, un mini ataque cerebral (ataque isquémico transitorio), o shira angina estable o inestable.  Linda catie también incluye a las personas que reyes

## 2018-08-16 ENCOUNTER — APPOINTMENT (OUTPATIENT)
Dept: PHYSICAL THERAPY | Facility: HOSPITAL | Age: 58
End: 2018-08-16
Attending: ORTHOPAEDIC SURGERY
Payer: COMMERCIAL

## 2018-08-16 PROCEDURE — 97110 THERAPEUTIC EXERCISES: CPT

## 2018-08-16 PROCEDURE — 97530 THERAPEUTIC ACTIVITIES: CPT

## 2018-08-16 NOTE — PROGRESS NOTES
Diagnosis: Derangement, knee internal, right (M23.91)  Acute medial meniscal tear, right, subsequent encounter (S83.241D)  Complex tear of lateral meniscus of right knee as current injury, subsequent encounter (S83.271D)  Chondromalacia, knee, right (M94.2 step up 6\" x 30 Fwd step up 6\" x 30 Fwd/back step 6inch x20 Fwd step up 6\" x 30 Fwd step up 6\" x 30 bilaterally Fwd step up 6\" x 30 bilaterally    lateral step over 6\" step x 30 lateral step over 6\" step x 50 Lateral step eccentric   6inch step x 20

## 2018-08-20 ENCOUNTER — OFFICE VISIT (OUTPATIENT)
Dept: PHYSICAL THERAPY | Facility: HOSPITAL | Age: 58
End: 2018-08-20
Attending: ORTHOPAEDIC SURGERY
Payer: COMMERCIAL

## 2018-08-20 PROCEDURE — 97110 THERAPEUTIC EXERCISES: CPT | Performed by: PHYSICAL THERAPIST

## 2018-08-20 PROCEDURE — 97530 THERAPEUTIC ACTIVITIES: CPT | Performed by: PHYSICAL THERAPIST

## 2018-08-20 NOTE — PROGRESS NOTES
Patient Name: Alida Salas [place]: 3/2/1960, MRN: [id number]   Date:  8/20/2018  Referring Physician:  Arian Hou    Diagnosis: Derangement, knee internal, right (M23.91)  Acute medial meniscal tear, right, subsequent encounter (F39.061D)  Com signed by therapist: Blake Walker, PT, DPT, OCS, Cert MDT     [de-identified] certification required: Yes  I certify the need for these services furnished under this plan of treatment and while under my care.     X_________________________________________

## 2018-08-20 NOTE — PROGRESS NOTES
Diagnosis: Derangement, knee internal, right (M23.91)  Acute medial meniscal tear, right, subsequent encounter (S83.241D)  Complex tear of lateral meniscus of right knee as current injury, subsequent encounter (S83.271D)  Chondromalacia, knee, right (M94.2 board 4 way x 30 ea, then maintain balance [facility] board 4 way x 30 ea, then maintain balance  SL A/P, M/L x 20 ea [facility] board 4 way x 30 ea, then maintain balance   Fwd step up 6\" x 30 Fwd step up 6\" x 30 Fwd/back step 6inch x20 Fwd step up 6\" x 30 Fwd restoring full knee ext and gradually increase activity.     Skilled Services: TE, theract    Charges: TE2 , ACT1     Total Timed Treatment: 44 min  Total Treatment Time: 45 min

## 2018-08-22 ENCOUNTER — APPOINTMENT (OUTPATIENT)
Dept: PHYSICAL THERAPY | Facility: HOSPITAL | Age: 58
End: 2018-08-22
Attending: ORTHOPAEDIC SURGERY
Payer: COMMERCIAL

## 2018-08-23 ENCOUNTER — OFFICE VISIT (OUTPATIENT)
Dept: PHYSICAL THERAPY | Facility: HOSPITAL | Age: 58
End: 2018-08-23
Attending: ORTHOPAEDIC SURGERY
Payer: COMMERCIAL

## 2018-08-23 PROCEDURE — 97110 THERAPEUTIC EXERCISES: CPT

## 2018-08-23 NOTE — PROGRESS NOTES
Diagnosis: Derangement, knee internal, right (M23.91)  Acute medial meniscal tear, right, subsequent encounter (S83.241D)  Complex tear of lateral meniscus of right knee as current injury, subsequent encounter (S83.271D)  Chondromalacia, knee, right (M94.2 hold Rocker board 4 way x 30 ea, then maintain balance Rocker board 4 way x 30 ea, then maintain balance Rocker board 4 way x 30 ea, then maintain balance  SL A/P, M/L x 20 ea Rocker board 4 way x 30 ea, then maintain balance Sit to stand-19.5 inches 30 se safe gait, lifting and carrying groceries and return to work. 3) single leg balance 30 seconds bilaterally to minimize fall risk. - MET  4) able to return to pain free standing, walking and stairs to allow return to working full duty.     Plan: continue to

## 2018-08-27 ENCOUNTER — OFFICE VISIT (OUTPATIENT)
Dept: PHYSICAL THERAPY | Facility: HOSPITAL | Age: 58
End: 2018-08-27
Attending: ORTHOPAEDIC SURGERY
Payer: COMMERCIAL

## 2018-08-27 PROCEDURE — 97110 THERAPEUTIC EXERCISES: CPT | Performed by: PHYSICAL THERAPIST

## 2018-08-27 NOTE — PROGRESS NOTES
Diagnosis: Derangement, knee internal, right (M23.91)  Acute medial meniscal tear, right, subsequent encounter (S83.241D)  Complex tear of lateral meniscus of right knee as current injury, subsequent encounter (S83.271D)  Chondromalacia, knee, right (M94.2 ea, then maintain balance Rocker board 4 way x 30 ea, then maintain balance [place] board  A/P, M/L x 20  SL A/P, M/L  X 20 ea with one UE hold Rocker board 4 way x 30 ea, then maintain balance Rocker board 4 way x 30 ea, then maintain balance Rocker board 9 min - coordination: lateral step, fwd step with high knees x 9 min HEP: longsitting hamstring stretch, TKE black TB coordination: lateral step, fwd step with high knees x 9 min          Assessment: pt progressing with control and coordination, knee ROM r

## 2018-08-28 ENCOUNTER — OFFICE VISIT (OUTPATIENT)
Dept: PHYSICAL THERAPY | Facility: HOSPITAL | Age: 58
End: 2018-08-28
Attending: ORTHOPAEDIC SURGERY
Payer: COMMERCIAL

## 2018-08-28 PROCEDURE — 97110 THERAPEUTIC EXERCISES: CPT

## 2018-08-29 ENCOUNTER — APPOINTMENT (OUTPATIENT)
Dept: PHYSICAL THERAPY | Facility: HOSPITAL | Age: 58
End: 2018-08-29
Attending: ORTHOPAEDIC SURGERY
Payer: COMMERCIAL

## 2018-09-01 PROBLEM — S83.231A COMPLEX TEAR OF MEDIAL MENISCUS OF RIGHT KNEE AS CURRENT INJURY: Status: ACTIVE | Noted: 2018-05-31

## 2018-09-20 NOTE — PROGRESS NOTES
Patient Name: Ethan Tracy [place]: 3/2/1960, MRN: [id number]   Date:  9/20/2018  Referring Physician:  Arben Mahan    Diagnosis: No diagnosis found. Discharge Summary    Pt has attended 14 visits in Physical Therapy.      Progress Note Start

## 2019-04-01 ENCOUNTER — OFFICE VISIT (OUTPATIENT)
Dept: FAMILY MEDICINE CLINIC | Facility: CLINIC | Age: 59
End: 2019-04-01

## 2019-04-01 VITALS
OXYGEN SATURATION: 97 % | DIASTOLIC BLOOD PRESSURE: 74 MMHG | SYSTOLIC BLOOD PRESSURE: 136 MMHG | WEIGHT: 174 LBS | HEIGHT: 68 IN | BODY MASS INDEX: 26.37 KG/M2 | HEART RATE: 84 BPM

## 2019-04-01 DIAGNOSIS — E78.00 HYPERCHOLESTEREMIA: ICD-10-CM

## 2019-04-01 DIAGNOSIS — K64.8 INTERNAL HEMORRHOIDS: ICD-10-CM

## 2019-04-01 DIAGNOSIS — E66.3 OVERWEIGHT (BMI 25.0-29.9): ICD-10-CM

## 2019-04-01 DIAGNOSIS — R35.1 BENIGN PROSTATIC HYPERPLASIA WITH NOCTURIA: ICD-10-CM

## 2019-04-01 DIAGNOSIS — Z98.890 HISTORY OF MEDIAL MENISCUS REPAIR OF RIGHT KNEE: ICD-10-CM

## 2019-04-01 DIAGNOSIS — Z00.01 ENCOUNTER FOR ROUTINE ADULT HEALTH EXAMINATION WITH ABNORMAL FINDINGS: Primary | ICD-10-CM

## 2019-04-01 DIAGNOSIS — Z98.890 HISTORY OF LATERAL MENISCUS REPAIR OF RIGHT KNEE: ICD-10-CM

## 2019-04-01 DIAGNOSIS — Z13.31 DEPRESSION SCREENING: ICD-10-CM

## 2019-04-01 DIAGNOSIS — Z98.890 HISTORY OF COLONOSCOPY WITH POLYPECTOMY: ICD-10-CM

## 2019-04-01 DIAGNOSIS — N40.1 BENIGN PROSTATIC HYPERPLASIA WITH NOCTURIA: ICD-10-CM

## 2019-04-01 DIAGNOSIS — M17.11 PRIMARY OSTEOARTHRITIS OF RIGHT KNEE: ICD-10-CM

## 2019-04-01 DIAGNOSIS — Z86.010 HISTORY OF COLONOSCOPY WITH POLYPECTOMY: ICD-10-CM

## 2019-04-01 PROCEDURE — 99396 PREV VISIT EST AGE 40-64: CPT

## 2019-04-02 ENCOUNTER — NURSE ONLY (OUTPATIENT)
Dept: FAMILY MEDICINE CLINIC | Facility: CLINIC | Age: 59
End: 2019-04-02

## 2019-04-02 DIAGNOSIS — N40.1 BENIGN PROSTATIC HYPERPLASIA WITH NOCTURIA: ICD-10-CM

## 2019-04-02 DIAGNOSIS — R35.1 BENIGN PROSTATIC HYPERPLASIA WITH NOCTURIA: ICD-10-CM

## 2019-04-02 DIAGNOSIS — Z01.89 ENCOUNTER FOR ROUTINE LABORATORY TESTING: ICD-10-CM

## 2019-04-02 DIAGNOSIS — E78.00 HYPERCHOLESTEREMIA: ICD-10-CM

## 2019-04-02 PROCEDURE — 80061 LIPID PANEL: CPT

## 2019-04-02 PROCEDURE — 85027 COMPLETE CBC AUTOMATED: CPT

## 2019-04-02 PROCEDURE — 80053 COMPREHEN METABOLIC PANEL: CPT

## 2019-04-02 PROCEDURE — 36415 COLL VENOUS BLD VENIPUNCTURE: CPT

## 2019-04-02 PROCEDURE — 81003 URINALYSIS AUTO W/O SCOPE: CPT

## 2019-04-05 DIAGNOSIS — R35.1 BENIGN PROSTATIC HYPERPLASIA WITH NOCTURIA: ICD-10-CM

## 2019-04-05 DIAGNOSIS — N40.1 BENIGN PROSTATIC HYPERPLASIA WITH NOCTURIA: ICD-10-CM

## 2019-04-05 DIAGNOSIS — R97.20 ELEVATED PSA MEASUREMENT: Primary | ICD-10-CM

## 2019-04-05 PROBLEM — M23.91 DERANGEMENT, KNEE INTERNAL, RIGHT: Status: RESOLVED | Noted: 2017-05-23 | Resolved: 2019-04-05

## 2019-04-05 PROBLEM — S83.231A COMPLEX TEAR OF MEDIAL MENISCUS OF RIGHT KNEE AS CURRENT INJURY: Status: RESOLVED | Noted: 2018-05-31 | Resolved: 2019-04-05

## 2019-04-05 PROBLEM — Z98.890: Status: ACTIVE | Noted: 2018-07-13

## 2019-04-05 PROBLEM — Z98.890: Status: ACTIVE | Noted: 2017-06-10

## 2019-04-05 PROBLEM — M94.261 CHONDROMALACIA, KNEE, RIGHT: Status: RESOLVED | Noted: 2017-05-23 | Resolved: 2019-04-05

## 2019-04-05 PROBLEM — H61.92 SKIN LESION OF LEFT EAR: Status: RESOLVED | Noted: 2018-01-23 | Resolved: 2019-04-05

## 2019-04-05 PROBLEM — M25.561 CHRONIC PAIN OF RIGHT KNEE: Status: RESOLVED | Noted: 2017-05-23 | Resolved: 2019-04-05

## 2019-04-05 PROBLEM — S83.271A COMPLEX TEAR OF LATERAL MENISCUS OF RIGHT KNEE AS CURRENT INJURY: Status: RESOLVED | Noted: 2018-07-19 | Resolved: 2019-04-05

## 2019-04-05 PROBLEM — G89.29 CHRONIC PAIN OF RIGHT KNEE: Status: RESOLVED | Noted: 2017-05-23 | Resolved: 2019-04-05

## 2019-04-05 RX ORDER — HYDROCODONE BITARTRATE AND ACETAMINOPHEN 7.5; 325 MG/1; MG/1
1 TABLET ORAL EVERY 6 HOURS PRN
Qty: 45 TABLET | Refills: 0 | COMMUNITY
Start: 2018-07-13 | End: 2021-11-02 | Stop reason: ALTCHOICE

## 2019-04-05 RX ORDER — TAMSULOSIN HYDROCHLORIDE 0.4 MG/1
0.4 CAPSULE ORAL DAILY
Qty: 90 CAPSULE | Refills: 3 | Status: SHIPPED | OUTPATIENT
Start: 2019-04-05

## 2019-04-05 RX ORDER — MELOXICAM 15 MG/1
15 TABLET ORAL
Qty: 35 TABLET | Refills: 3 | COMMUNITY
Start: 2018-07-13 | End: 2021-11-02 | Stop reason: ALTCHOICE

## 2019-04-05 NOTE — PATIENT INSTRUCTIONS
Pautas de prevención para hombres de 48 a 59 años de [address] [facility] de detección y las vacunas son importantes para el manejo de schofield leda.  La consejería sobre schofield leda también es esencial. A continuación, verá pautas en relación con esos temas para ho [employer] de colesterol o triglicéridos Medtronic hombres de zulema catie de edad Al menos cada erasto años   VIH Los hombres con mayor riesgo de infección; hable con schofield proveedor de atención médica En los exámenes de rutina   Cáncer de pulmón Adultos entre Haemophilus influenzae Tipo B (HIB) Los hombres con mayor riesgo de infección; hable con schofield proveedor de atención médica [place] a melany dosis   Gripe (flu) Medtronic hombres de [place] catie de [address] [facility], [address], irineo y Lauri Pember[place] (“MMR” por imelda sig Date Last Reviewed: 3/30/2015  © 8503-9383 [employer]. [facility] [place], [address] [place]. All rights reserved. This information is not intended as a substitute for professional medical care.  Always follow your healthcare professional

## 2019-04-05 NOTE — PROGRESS NOTES
CC: Annual Physical Exam    HPI:   Hedy Ashley is a 64year old male who presents for a complete physical exam. Pt denies any acute complaints at this time.     Wt Readings from Last 6 Encounters:  04/01/19 : 174 lb  08/14/18 : 169 lb  07/13/18 : 172 hyperplastic rectal polyp and moderate sized internal hemorrhoids   • KNEE ARTHROSCOPY Right 07/13/2018    RIGHT KNEE ARTHROSCOPY   • KNEE ARTHROSCOPY Right 7/13/2018    Performed by Lurdes Floyd MD at [address] [place] [address] MAIN OR      Family History   Problem Relati heat intolerance, polyuria or polydipsia. ALLERGIES:  Denies allergic response, history of asthma, sneezing, hives, eczema or rhinitis.     EXAM:   /74 (BP Location: Right arm, Patient Position: Sitting, Cuff Size: adult)   Pulse 84   Ht 68\"   Wt 17 answered. - Age/sex specific preventive measures/immunizations reviewed and discussed with pt. - Will obtain CBC and UA and f/u as appropriate. 2. Hypercholesteremia  - Omega 3 fish oil pills 2-4g PO daily recommended.   - Will obtain CMP and lipid pan

## 2019-04-10 ENCOUNTER — OFFICE VISIT (OUTPATIENT)
Dept: FAMILY MEDICINE CLINIC | Facility: CLINIC | Age: 59
End: 2019-04-10

## 2019-04-10 VITALS
SYSTOLIC BLOOD PRESSURE: 124 MMHG | OXYGEN SATURATION: 96 % | WEIGHT: 176 LBS | HEART RATE: 75 BPM | BODY MASS INDEX: 27 KG/M2 | DIASTOLIC BLOOD PRESSURE: 70 MMHG

## 2019-04-10 DIAGNOSIS — R35.1 BENIGN PROSTATIC HYPERPLASIA WITH NOCTURIA: ICD-10-CM

## 2019-04-10 DIAGNOSIS — R97.20 ELEVATED PSA MEASUREMENT: Primary | ICD-10-CM

## 2019-04-10 DIAGNOSIS — N40.1 BENIGN PROSTATIC HYPERPLASIA WITH NOCTURIA: ICD-10-CM

## 2019-04-10 DIAGNOSIS — E78.00 HYPERCHOLESTEREMIA: ICD-10-CM

## 2019-04-10 PROCEDURE — 99213 OFFICE O/P EST LOW 20 MIN: CPT

## 2019-04-14 PROBLEM — N18.30 CKD (CHRONIC KIDNEY DISEASE) STAGE 3, GFR 30-59 ML/MIN (HCC): Status: ACTIVE | Noted: 2019-04-14

## 2019-04-14 PROBLEM — Z13.31 DEPRESSION SCREENING: Status: RESOLVED | Noted: 2018-01-23 | Resolved: 2019-04-14

## 2019-04-14 PROBLEM — N18.30 CKD (CHRONIC KIDNEY DISEASE) STAGE 3, GFR 30-59 ML/MIN (HCC): Status: ACTIVE | Noted: 2018-01-25

## 2019-04-14 PROBLEM — Z00.01 ENCOUNTER FOR ROUTINE ADULT HEALTH EXAMINATION WITH ABNORMAL FINDINGS: Status: RESOLVED | Noted: 2017-06-10 | Resolved: 2019-04-14

## 2019-04-14 NOTE — PROGRESS NOTES
HPI:    Patient ID: Elder Mckeon is a 64year old male. Hyperlipidemia   This is a chronic problem. The current episode started more than 1 year ago. The problem is uncontrolled. Exacerbating diseases include chronic renal disease.  Factors aggrava Genitourinary: Positive for difficulty urinating and frequency. Negative for decreased urine volume, dysuria and hematuria. Musculoskeletal: Negative for arthralgias, back pain, joint swelling, myalgias and neck pain. Skin: Negative for rash.    Neuro improve and in 1 year for annual physical exam.      No orders of the defined types were placed in this encounter.       Meds This Visit:  Requested Prescriptions      No prescriptions requested or ordered in this encounter       Imaging & Referrals:  None

## 2019-04-14 NOTE — PATIENT INSTRUCTIONS
Antígeno prostático específico  [place]aya análisis otros nombres? PSA. ¿Qué es zulema análisis? Zulema análisis mide el nivel de antígeno prostático específico (\"PSA\", por imelda siglas en inglés) que hay en schofield ofelia.   Las células de la glándula prostát También le pueden realizar zulema análisis si ya le reyes diagnosticado cáncer de la próstata, de Arianna que schofield proveedor de atención médica pueda vigilar schofield tratamiento y thanh si schofield cáncer ha reaparecido.   ¿Qué otros análisis podrían hacerme junto con zulema?  Hakan shira American [place] de ofelia con shiar aguja implica riesgos que incluyen Pedro Luis, [place], moretones o sensación de Lydia. Cuando pinchen schofield brazo con la [place], es posible que sienta shira leve sensación de escozor o dolor.  Después, el sitio puede estar lev

## 2019-05-20 ENCOUNTER — OFFICE VISIT (OUTPATIENT)
Dept: FAMILY MEDICINE CLINIC | Facility: CLINIC | Age: 59
End: 2019-05-20

## 2019-05-20 VITALS
BODY MASS INDEX: 26.98 KG/M2 | HEART RATE: 69 BPM | SYSTOLIC BLOOD PRESSURE: 138 MMHG | DIASTOLIC BLOOD PRESSURE: 70 MMHG | OXYGEN SATURATION: 97 % | HEIGHT: 68 IN | WEIGHT: 178 LBS

## 2019-05-20 DIAGNOSIS — L91.8 CUTANEOUS SKIN TAGS: Primary | ICD-10-CM

## 2019-05-20 PROCEDURE — 99212 OFFICE O/P EST SF 10 MIN: CPT

## 2019-05-20 PROCEDURE — 11200 RMVL SKIN TAGS UP TO&INC 15: CPT

## 2019-05-25 PROBLEM — L91.8 CUTANEOUS SKIN TAGS: Status: ACTIVE | Noted: 2019-05-20

## 2019-05-25 NOTE — PROGRESS NOTES
HPI:    Patient ID: Maci Seen is a 64year old male. Mass   This is a chronic problem. Episode onset: few years ago. The problem occurs constantly. The problem has been gradually worsening (new lesions appearing and older ones getting bigger). Oral Tab Take 1 tablet (15 mg total) by mouth daily with food. Disp: 35 tablet Rfl: 3     Allergies:No Known Allergies   PHYSICAL EXAM:   Physical Exam   Constitutional: He is oriented to person, place, and time.  He appears well-developed and well-nourishe

## 2019-05-25 NOTE — PROCEDURES
Areas were cleaned using aseptic technique with alcohol x3. Lesions were injected with a total of 2cc of 2% Lidocaine with Epinephrine in order to achieve anesthesia.   A total of 2 lesions were removed from around his neck area by cutting them off using

## 2019-06-03 ENCOUNTER — OFFICE VISIT (OUTPATIENT)
Dept: SURGERY | Facility: CLINIC | Age: 59
End: 2019-06-03

## 2019-06-03 ENCOUNTER — TELEPHONE (OUTPATIENT)
Dept: SURGERY | Facility: CLINIC | Age: 59
End: 2019-06-03

## 2019-06-03 VITALS
RESPIRATION RATE: 16 BRPM | DIASTOLIC BLOOD PRESSURE: 87 MMHG | SYSTOLIC BLOOD PRESSURE: 135 MMHG | HEIGHT: 68 IN | WEIGHT: 178 LBS | BODY MASS INDEX: 26.98 KG/M2 | HEART RATE: 79 BPM | TEMPERATURE: 98 F

## 2019-06-03 DIAGNOSIS — R35.0 BENIGN PROSTATIC HYPERPLASIA WITH URINARY FREQUENCY: ICD-10-CM

## 2019-06-03 DIAGNOSIS — R97.20 RISING PSA LEVEL: ICD-10-CM

## 2019-06-03 DIAGNOSIS — N40.1 BENIGN PROSTATIC HYPERPLASIA WITH URINARY FREQUENCY: ICD-10-CM

## 2019-06-03 DIAGNOSIS — R97.20 ELEVATED PSA: Primary | ICD-10-CM

## 2019-06-03 DIAGNOSIS — R35.1 NOCTURIA: ICD-10-CM

## 2019-06-03 PROCEDURE — 99212 OFFICE O/P EST SF 10 MIN: CPT | Performed by: UROLOGY

## 2019-06-03 PROCEDURE — 99245 OFF/OP CONSLTJ NEW/EST HI 55: CPT | Performed by: UROLOGY

## 2019-06-03 RX ORDER — CIPROFLOXACIN 500 MG/1
TABLET, FILM COATED ORAL
Qty: 6 TABLET | Refills: 0 | Status: SHIPPED | OUTPATIENT
Start: 2019-06-03 | End: 2021-11-02 | Stop reason: ALTCHOICE

## 2019-06-03 RX ORDER — CEFDINIR 300 MG/1
300 CAPSULE ORAL EVERY 12 HOURS
Qty: 6 CAPSULE | Refills: 0 | Status: SHIPPED | OUTPATIENT
Start: 2019-06-03 | End: 2019-06-06

## 2019-06-03 NOTE — PATIENT INSTRUCTIONS
Fransico Etienne M.D.      1.   Continue Tamsulosin 0.4 mg daily    ------------------------------------------------------------------------    1.   Proceed with plans for ultrasound-guided needle biopsy of prostate in the offic

## 2019-06-03 NOTE — PROGRESS NOTES
Ma Carrel is a 64year old male.     HPI:   Patient presents with:  elevated psa: I used the language line client ID # [id number], Arnaud Armando  BPH: pt has been on tamulosin for about 1 month, freq urination    [facility] [place] translat • Hyperplastic rectal polyp 12/19/16   • Internal hemorrhoids 12/19/16      Past Surgical History:   Procedure Laterality Date   • COLONOSCOPY  12/19/16    small hyperplastic rectal polyp and moderate sized internal hemorrhoids   • KNEE ARTHROSCOPY Right polydipsia and polyphagia    Allergic/Immuno:  Negative for environmental allergies and food allergies  Cardiovascular:  Negative for cool extremity and irregular heartbeat/palpitations  Constitutional:  Negative for decreased activity, fever, irritability nodules of the apex.   Skin/Hair: no unusual rashes present no abnormal bruising noted ; abdominal scars None  Back/Spine: no abnormalities noted  Extremities: no cyanosis, or clubbing; edema None     06/03/19  1538   BP: 135/87   Pulse: 79   Resp: 16   Tem Nocturia  Patient has current AUA score of 4, mild voiding dysfunction category. Patient takes tamsulosin 0.4 mg daily. He feels this is stable on tamsulosin and decides to continue as prescribed. RECOMMENDATIONS AND TREATMENT PLAN      1.    Continue 6/3/2019  Jean Buchanan      By signing my name below, I, Jean Buchanan,  attest that this documentation has been prepared under the direction and in the presence of Ayse Parks MD.   Electronically Signed: Jean Buchanan, 6/3/2019,

## 2019-06-17 PROBLEM — R35.0 BENIGN PROSTATIC HYPERPLASIA WITH URINARY FREQUENCY: Status: ACTIVE | Noted: 2019-06-03

## 2019-06-17 PROBLEM — N40.1 BENIGN PROSTATIC HYPERPLASIA WITH URINARY FREQUENCY: Status: ACTIVE | Noted: 2019-06-03

## 2019-06-24 ENCOUNTER — OFFICE VISIT (OUTPATIENT)
Dept: SURGERY | Facility: CLINIC | Age: 59
End: 2019-06-24

## 2019-06-24 VITALS
TEMPERATURE: 98 F | RESPIRATION RATE: 16 BRPM | HEART RATE: 72 BPM | BODY MASS INDEX: 26.98 KG/M2 | SYSTOLIC BLOOD PRESSURE: 110 MMHG | DIASTOLIC BLOOD PRESSURE: 80 MMHG | HEIGHT: 68 IN | WEIGHT: 178 LBS

## 2019-06-24 DIAGNOSIS — R97.20 ELEVATED PSA: Primary | ICD-10-CM

## 2019-06-24 DIAGNOSIS — R97.20 RISING PSA LEVEL: ICD-10-CM

## 2019-06-24 PROCEDURE — 55700 BIOPSY OF PROSTATE,NEEDLE/PUNCH: CPT | Performed by: UROLOGY

## 2019-06-24 PROCEDURE — 76872 US TRANSRECTAL: CPT | Performed by: UROLOGY

## 2019-06-24 PROCEDURE — 76942 ECHO GUIDE FOR BIOPSY: CPT | Performed by: UROLOGY

## 2019-06-24 NOTE — PROGRESS NOTES
[facility] AT [facility] Patient Status:  Outpatient    3/2/1960 MRN [id number]   Location [address] [place] [address] Attending [facility] [place] [facility] # 0 PCP Mari Montilla MD       [facility],  [address] preserved on hard copy. Pathology was noted. Prostate volume was determined by imaging the prostate in the transverse and sagittal planes and determining maximum height, width and length dimensions.   The decision was then made to proceed with prostate biop drink enough fluids to dilute out the blood.     5.   If you suddenly develop a fever greater than 100.5 during the next 4 days, call me or urologist on call on an urgent basis but also head  for the emergency room, since fever during the next 4 days could

## 2019-06-28 ENCOUNTER — TELEPHONE (OUTPATIENT)
Dept: SURGERY | Facility: CLINIC | Age: 59
End: 2019-06-28

## 2019-06-28 PROBLEM — C61 PROSTATIC ADENOCARCINOMA (HCC): Status: ACTIVE | Noted: 2019-06-24

## 2019-06-28 NOTE — TELEPHONE ENCOUNTER
----- Message from Kristofer Coats MD sent at 6/27/2019 11:21 PM CDT -----  Staff, please have 1 of our [place]-speaking staff members call patient or else use language line to set up 40-minute visit/discussion with me and emphasized that patient must ha

## 2019-08-01 ENCOUNTER — OFFICE VISIT (OUTPATIENT)
Dept: SURGERY | Facility: CLINIC | Age: 59
End: 2019-08-01

## 2019-08-01 VITALS
DIASTOLIC BLOOD PRESSURE: 74 MMHG | HEART RATE: 82 BPM | WEIGHT: 172 LBS | SYSTOLIC BLOOD PRESSURE: 121 MMHG | TEMPERATURE: 98 F | BODY MASS INDEX: 26 KG/M2

## 2019-08-01 DIAGNOSIS — C61 PROSTATE CANCER (HCC): Primary | ICD-10-CM

## 2019-08-01 DIAGNOSIS — R35.1 NOCTURIA: ICD-10-CM

## 2019-08-01 DIAGNOSIS — R33.8 BENIGN PROSTATIC HYPERPLASIA WITH URINARY RETENTION: ICD-10-CM

## 2019-08-01 DIAGNOSIS — N40.1 BENIGN PROSTATIC HYPERPLASIA WITH URINARY RETENTION: ICD-10-CM

## 2019-08-01 PROCEDURE — 99215 OFFICE O/P EST HI 40 MIN: CPT | Performed by: UROLOGY

## 2019-08-01 NOTE — PROGRESS NOTES
Urology visit                                     64 year-old male comes to the office with his niece, Pino Thompson who translated from [place]   and history provided by both of them. He wants to be evaluated for ---  1. Newly diagnosed prostate cancer  2.   V Medicine); urinary hesitancy and frequency, chronic; improved after taking Flomax; pt restarted on tamsulosin 0.4 mg PO daily; will refer to Dr. Theresa Winchester (urology) for further evaluation  6/3/2019 Dr. Leslie Godoy consult; AUA 4--continue tamsulosin 0.4 mg daily; Negative Negative   BLOODURINE Negative Negative Negative Negative   NITRITE Negative Negative Negative Negative   UROBILINOGEN <2.0 <2.0 2.0* <2.0   LEUUR Negative Negative Negative Negative   UASA Negative Negative Negative Negative   WBCUR 0  --  1  -- beam radiation, IMRT, CyberKnife radiotherapy, proton beam therapy, cryo surgery, hormone ablation injections with or without oral antiandrogens, and also observation therapy.   I also explained the preparations necessary to undergo these treatments and als daily.    Renal Impairment  4/2/2019 creatinine = 1.39, GFR = 56. Recommendations and Treatment plan ---    1. You have chosen to undergo ACTIVE SURVEILLANCE THERAPY  Of your prostate cancer for now    2. Visit end of November 2019.    Blood draw fo

## 2019-08-01 NOTE — PATIENT INSTRUCTIONS
Raphael Avendaño M.D.    1.  You have chosen to undergo ACTIVE SURVEILLANCE THERAPY  Of your prostate cancer for now    2. Visit end of November 2019.    Blood draw for PSA 1 -- 10 days     B E F O R E    Visit  No sexual acti

## 2020-05-23 DIAGNOSIS — R35.1 BENIGN PROSTATIC HYPERPLASIA WITH NOCTURIA: ICD-10-CM

## 2020-05-23 DIAGNOSIS — N40.1 BENIGN PROSTATIC HYPERPLASIA WITH NOCTURIA: ICD-10-CM

## 2020-06-01 RX ORDER — TAMSULOSIN HYDROCHLORIDE 0.4 MG/1
CAPSULE ORAL
Qty: 90 CAPSULE | Refills: 3 | OUTPATIENT
Start: 2020-06-01

## 2021-11-02 ENCOUNTER — OFFICE VISIT (OUTPATIENT)
Dept: FAMILY MEDICINE CLINIC | Facility: CLINIC | Age: 61
End: 2021-11-02
Payer: COMMERCIAL

## 2021-11-02 VITALS
HEART RATE: 79 BPM | HEIGHT: 67.5 IN | OXYGEN SATURATION: 99 % | DIASTOLIC BLOOD PRESSURE: 70 MMHG | TEMPERATURE: 98 F | SYSTOLIC BLOOD PRESSURE: 126 MMHG | WEIGHT: 170 LBS | BODY MASS INDEX: 26.37 KG/M2

## 2021-11-02 DIAGNOSIS — Z11.59 NEED FOR HEPATITIS C SCREENING TEST: ICD-10-CM

## 2021-11-02 DIAGNOSIS — C61 PROSTATE CANCER (HCC): ICD-10-CM

## 2021-11-02 DIAGNOSIS — Z00.00 ANNUAL PHYSICAL EXAM: Primary | ICD-10-CM

## 2021-11-02 PROCEDURE — 80061 LIPID PANEL: CPT | Performed by: INTERNAL MEDICINE

## 2021-11-02 PROCEDURE — 3074F SYST BP LT 130 MM HG: CPT | Performed by: INTERNAL MEDICINE

## 2021-11-02 PROCEDURE — 99386 PREV VISIT NEW AGE 40-64: CPT | Performed by: INTERNAL MEDICINE

## 2021-11-02 PROCEDURE — 86803 HEPATITIS C AB TEST: CPT | Performed by: INTERNAL MEDICINE

## 2021-11-02 PROCEDURE — 3078F DIAST BP <80 MM HG: CPT | Performed by: INTERNAL MEDICINE

## 2021-11-02 PROCEDURE — 80053 COMPREHEN METABOLIC PANEL: CPT | Performed by: INTERNAL MEDICINE

## 2021-11-02 PROCEDURE — 3008F BODY MASS INDEX DOCD: CPT | Performed by: INTERNAL MEDICINE

## 2021-11-02 PROCEDURE — 85027 COMPLETE CBC AUTOMATED: CPT | Performed by: INTERNAL MEDICINE

## 2021-11-04 NOTE — PROGRESS NOTES
[address]  New Patient History and Physical      HPI:   Patient presents with:  Physical: yearly exam      Crys Shearer is a 63year old male presenting for:  annual  Has  has a past medical history of BPH (benign prostatic hy Prostate Specific Antigen Screen 3.15 <=4.00 ng/mL             Labs:   Complete Metabolic Panel:  Lab Results   Component Value Date/Time     11/02/2021 12:28 PM    K 4.4 11/02/2021 12:28 PM     11/02/2021 12:28 PM    CO2 26.0 11/02/2021 12:28 tobacco: Never Used    Alcohol use: No    Drug use: No       Family History:  Family History   Problem Relation Age of Onset   • No Known Problems Father    • Hypertension Mother    • No Known Problems Son    • Hypertension Sister    • No Known Problems So kg/m² as calculated from the following:    Height as of this encounter: 5' 7.5\" (1.715 m). Weight as of this encounter: 170 lb (77.1 kg).    Wt Readings from Last 3 Encounters:  11/02/21 : 170 lb (77.1 kg)  08/01/19 : 172 lb (78 kg)  06/24/19 : 178 lb ( COMP METABOLIC PANEL (14), CBC,         PLATELET; NO DIFFERENTIAL, LIPID PANEL, COMP         METABOLIC PANEL (14)           (Z11.59) Need for hepatitis C screening test  Plan: HCV ANTIBODY, HCV ANTIBODY            (C61) Prostate cancer ([facility] [address]  Plan: Has fol

## 2021-11-30 ENCOUNTER — TELEPHONE (OUTPATIENT)
Dept: FAMILY MEDICINE CLINIC | Facility: CLINIC | Age: 61
End: 2021-11-30

## 2021-11-30 NOTE — TELEPHONE ENCOUNTER
Pt came in requesting bw results  Pt stated that someone was suppose to call him with in a couple of days but he never received a call  Please call back and advise

## 2022-03-16 NOTE — PATIENT INSTRUCTIONS
[facility] MEDICINE PROGRESS NOTE   Patient: Ricci Jaimes  Today's Date: 3/16/2022    YOB: 1946  Admission Date: 3/14/2022    MRN: [id number]  Inpatient LOS: 2    Room: 327/01  Hospital Day: Hospital Day: 3    Subjective   History and Subjective complaints     Follow-up for: severe sepsis, CAP, bacteremia    Interval History / Subjective:   Patient reports feeling about the same today.  2D echocardiogram revealed no evidence of cardiac infection.  Blood cultures were positive for MSSA.    Hospital Course:  Ricci Jaimes is a 76 year old male who presented on 3/14/2022 with complaints of Back Pain    76-year-old gentleman admitted with acute hypoxemic respiratory failure secondary to severe sepsis due to community-acquired pneumonia.  Blood cultures were positive for Staphylococcus and Infectious Disease was consulted.  2D echocardiogram was obtained and showed no evidence of endocarditis.  Rojelio is pending.  Empirically on vancomycin and cefepime.    ROS:  Pertinent systems negative except as above.    Objective     Scheduled Medications:    warfarin, 4 mg, Once  [START ON 3/17/2022] furosemide, 40 mg, Daily  ceFAZolin, 2,000 mg, 3 times per day  senna, 1 tablet, Nightly  docusate sodium, 100 mg, Daily  sodium chloride (PF), 2 mL, 2 times per day  sodium chloride, 1,000 mL, Once  aspirin, 81 mg, Daily  atorvastatin, 80 mg, Daily  febuxostat, 40 mg, Every Other Day  metoPROLOL succinate, 100 mg, Daily  WARFARIN - PHARMACIST MONITORED, , See Admin Instructions  guaiFENesin, 1,200 mg, 2 times per day  Potassium Standard Replacement Protocol, , See Admin Instructions  Magnesium Standard Replacement Protocol, , See Admin Instructions  Phosphorus Standard Replacement Protocol, , See Admin Instructions  lactobacillus acidophilus, 1 tablet, Daily      Continuous Infusions:    PRN Medications:  bisacodyl, sodium chloride, acetaminophen, polyethylene glycol, docusate sodium-sennosides,  Fransico Etienne M.D.    1. Finish your prescribed 3 day course of the 2 different antibiotics as directed. 2. No heavy lifting or strenuous physical activity for a few days. 3. No aspirin or NSAIDs for 24 hours.     4. If you magnesium hydroxide, aluminum-magnesium hydroxide-simethicone, sodium chloride, traMADol  Physical Examination     General:  In NAD.  Conversant.  Awake, Alert, Oriented to time, place, person.  Vital Signs:    Visit Vitals  /70 (BP Location: RUE - Right upper extremity, Patient Position: Semi-Barron's)   Pulse (!) 114   Temp 99 °F (37.2 °C) (Oral)   Resp 16   Ht 5' 9\" (1.753 m)   Wt 104.1 kg (229 lb 8 oz)   SpO2 91%   BMI 33.89 kg/m²     Skin:  Moist and warm, no rashes.  Neck:  Supple, symmetric.  No JVD, No carotid bruit.  Normal carotid upstroke and amplitude.   Respiratory:  CTA B/L.  Good effort.  Cardiovascular:  S1, S2.  RRR.  2+ pedal pulses.  Abdomen:  Soft, nontender, NRT, No HSM.  Extremities:  No edema or calf tenderness B/L.  No acrocyanosis.   Neurologic:  CN II-XII are grossly intact.  Full strength in upper and lower extremities B/L.    Intake and Output:      Intake/Output Summary (Last 24 hours) at 3/16/2022 1325  Last data filed at 3/16/2022 0500  Gross per 24 hour   Intake 660 ml   Output 800 ml   Net -140 ml       Last Stool Occurrence:  1 (03/15/22 1749)    Tubes, Devices, Monitoring     Telemetry: On  Consults:    PHARMACY TO DOSE AND MONITOR WARFARIN  IP CONSULT TO SMOKING CESSATION PROGRAM  IP CONSULT TO SOCIAL WORK  IP CONSULT TO INFECTIOUS DISEASES  Diet:  Cardiac Diet  Therapy Orders:    PT and OT Orders Placed this Encounter   Procedures   • Occupational Therapy   • Physical Therapy     Limited English proficiency with :  No       Test Results     Labs: The Laboratory values listed below have been reviewed and pertinent findings discussed in the Assessment and Plan.    Radiology: Imaging studies have been reviewed and pertinent findings discussed in the Assessment and Plan.  Results for orders placed or performed during the hospital encounter of 03/14/22 (from the past 48 hour(s))   MRI THORACIC SPINE W WO  CONTRAST    Impression    IMPRESSION:    1. No evidence for  discitis, osteomyelitis, or epidural abscess.    2. Multilevel mild degenerative changes most notably at the mid and lower  cervical spine. At C6-7, a small central disc protrusion partially effaces  the ventral CSF. There is no significant degenerative canal stenosis  throughout the cervical or thoracic spine. There is mild and/or moderate  foraminal stenosis at C3-4 through C6-7.   MRI  CERVICAL SPINE W WO  CONTRAST    Impression    IMPRESSION:    1. No evidence for discitis, osteomyelitis, or epidural abscess.    2. Multilevel mild degenerative changes most notably at the mid and lower  cervical spine. At C6-7, a small central disc protrusion partially effaces  the ventral CSF. There is no significant degenerative canal stenosis  throughout the cervical or thoracic spine. There is mild and/or moderate  foraminal stenosis at C3-4 through C6-7.   XR CHEST AP OR PA    Impression    IMPRESSION: Diffuse bilateral hazy faint lung infiltrates may indicate  pulmonary edema or atypical/viral infiltrate with changes appearing  increased from recent CT chest findings          Glucose (mg/dL)   Date Value   03/16/2022 110 (H)   03/15/2022 99   03/14/2022 98       WBC (K/mcL)   Date Value   03/16/2022 9.0   03/15/2022 15.0 (H)   03/14/2022 15.5 (H)     HGB (g/dL)   Date Value   03/16/2022 11.7 (L)   03/15/2022 12.8 (L)   03/14/2022 15.0    Sodium (mmol/L)   Date Value   03/16/2022 141   03/15/2022 138   03/14/2022 144    BUN (mg/dL)   Date Value   03/16/2022 37 (H)   03/15/2022 33 (H)   03/14/2022 31 (H)      PLT (K/mcL)   Date Value   03/16/2022 104 (L)   03/15/2022 121 (L)   03/14/2022 170     Potassium (mmol/L)   Date Value   03/16/2022 4.9   03/15/2022 5.1   03/15/2022 5.1    Creatinine (mg/dL)   Date Value   03/16/2022 2.07 (H)   03/15/2022 2.12 (H)   03/14/2022 2.20 (H)        Troponin I, Ultra Sensitive (ng/mL)   Date Value   02/09/2021 <0.02   01/29/2021 <0.02   01/29/2021 <0.02      CK (Units/L)   Date Value    01/10/2022 191      CKMB (ng/mL)   Date Value   09/01/2011 0.9     INR (no units)   Date Value   03/16/2022 2.3     Hemoglobin A1C (%)   Date Value   11/09/2021 6.1 (H)       Advanced Directives     Code Status: Full Resuscitation     Assessment and Plan     Assessment:  1. Acute hypoxemic respiratory failure with severe sepsis secondary to community-acquired pneumonia.  Patient will be started on community-acquired pneumonia protocol, probiotic will be added.  Lactic acid has normalized with hydration.  MSSA bacteremia.  No evidence of endocarditis on 2D echocardiogram from 03/15/2022.  Rojelio is pending.  Nuclear scan is pending as well.     2. Obstructive sleep apnea.  Patient is compliant with nocturnal CPAP.     3. Stage III B CKD.  Appears to be stable at this time.     4. Essential hypertension.  Continue home medications.     5. Dyslipidemia.  Continue statin.     6. Remote history of stroke without sequela.       7. Class 1 obesity.     8. Prostate cancer.     9. Peripheral vascular disease with left carotid artery occlusion.       10. Heart failure with preserved ejection fraction 59% as of 2021.  No evidence of exacerbation at this time.     11. Chronic persistent atrial fibrillation on chronic anticoagulation with warfarin.  No INR was obtained in the ED.  Will continue anticoagulation during hospital stay    Plan:  · Antibiotic adjustment per Infectious Disease recommendations  · Proceed with ROJELIO  · Probiotic   · Full code   · DVT prophylaxis   · P.T./OT    Discharge Planing      Recommendations for Discharge   SW     PT Home   OT Home, Home therapy   SLP        Anticipated discharge destination:  To be determined  Expected Discharge Date:  To be determined  Barriers to Discharge:  Patient's clinical condition    Antwon Lopez MD  Hospitalist  3/16/2022  1:25 PM

## 2022-07-28 ENCOUNTER — OFFICE VISIT (OUTPATIENT)
Dept: INTERNAL MEDICINE CLINIC | Facility: CLINIC | Age: 62
End: 2022-07-28
Payer: COMMERCIAL

## 2022-07-28 ENCOUNTER — LAB ENCOUNTER (OUTPATIENT)
Dept: LAB | Facility: HOSPITAL | Age: 62
End: 2022-07-28
Attending: INTERNAL MEDICINE
Payer: COMMERCIAL

## 2022-07-28 VITALS
WEIGHT: 175.38 LBS | DIASTOLIC BLOOD PRESSURE: 84 MMHG | SYSTOLIC BLOOD PRESSURE: 128 MMHG | HEIGHT: 67.5 IN | HEART RATE: 88 BPM | OXYGEN SATURATION: 100 % | BODY MASS INDEX: 27.2 KG/M2

## 2022-07-28 DIAGNOSIS — Z00.00 PREVENTATIVE HEALTH CARE: ICD-10-CM

## 2022-07-28 DIAGNOSIS — Z85.46 HISTORY OF PROSTATE CANCER: ICD-10-CM

## 2022-07-28 DIAGNOSIS — Z13.1 DIABETES MELLITUS SCREENING: ICD-10-CM

## 2022-07-28 DIAGNOSIS — N40.1 BENIGN PROSTATIC HYPERPLASIA WITH NOCTURIA: ICD-10-CM

## 2022-07-28 DIAGNOSIS — Z00.00 PREVENTATIVE HEALTH CARE: Primary | ICD-10-CM

## 2022-07-28 DIAGNOSIS — R35.1 BENIGN PROSTATIC HYPERPLASIA WITH NOCTURIA: ICD-10-CM

## 2022-07-28 LAB
ALBUMIN SERPL-MCNC: 3.4 G/DL (ref 3.4–5)
ALBUMIN/GLOB SERPL: 1 {RATIO} (ref 1–2)
ALP LIVER SERPL-CCNC: 62 U/L
ALT SERPL-CCNC: 33 U/L
ANION GAP SERPL CALC-SCNC: 6 MMOL/L (ref 0–18)
AST SERPL-CCNC: 20 U/L (ref 15–37)
BASOPHILS # BLD AUTO: 0.04 X10(3) UL (ref 0–0.2)
BASOPHILS NFR BLD AUTO: 0.7 %
BILIRUB SERPL-MCNC: 0.7 MG/DL (ref 0.1–2)
BUN BLD-MCNC: 20 MG/DL (ref 7–18)
BUN/CREAT SERPL: 15.5 (ref 10–20)
CALCIUM BLD-MCNC: 9.8 MG/DL (ref 8.5–10.1)
CHLORIDE SERPL-SCNC: 109 MMOL/L (ref 98–112)
CHOLEST SERPL-MCNC: 188 MG/DL (ref ?–200)
CO2 SERPL-SCNC: 28 MMOL/L (ref 21–32)
CREAT BLD-MCNC: 1.29 MG/DL
DEPRECATED RDW RBC AUTO: 43.9 FL (ref 35.1–46.3)
EOSINOPHIL # BLD AUTO: 0.18 X10(3) UL (ref 0–0.7)
EOSINOPHIL NFR BLD AUTO: 3 %
ERYTHROCYTE [DISTWIDTH] IN BLOOD BY AUTOMATED COUNT: 12.3 % (ref 11–15)
EST. AVERAGE GLUCOSE BLD GHB EST-MCNC: 103 MG/DL (ref 68–126)
FASTING PATIENT LIPID ANSWER: YES
FASTING STATUS PATIENT QL REPORTED: YES
GLOBULIN PLAS-MCNC: 3.3 G/DL (ref 2.8–4.4)
GLUCOSE BLD-MCNC: 90 MG/DL (ref 70–99)
HBA1C MFR BLD: 5.2 % (ref ?–5.7)
HCT VFR BLD AUTO: 47.1 %
HDLC SERPL-MCNC: 50 MG/DL (ref 40–59)
HGB BLD-MCNC: 15.6 G/DL
IMM GRANULOCYTES # BLD AUTO: 0.03 X10(3) UL (ref 0–1)
IMM GRANULOCYTES NFR BLD: 0.5 %
LDLC SERPL CALC-MCNC: 112 MG/DL (ref ?–100)
LYMPHOCYTES # BLD AUTO: 1.54 X10(3) UL (ref 1–4)
LYMPHOCYTES NFR BLD AUTO: 25.3 %
MCH RBC QN AUTO: 31.9 PG (ref 26–34)
MCHC RBC AUTO-ENTMCNC: 33.1 G/DL (ref 31–37)
MCV RBC AUTO: 96.3 FL
MONOCYTES # BLD AUTO: 0.42 X10(3) UL (ref 0.1–1)
MONOCYTES NFR BLD AUTO: 6.9 %
NEUTROPHILS # BLD AUTO: 3.88 X10 (3) UL (ref 1.5–7.7)
NEUTROPHILS # BLD AUTO: 3.88 X10(3) UL (ref 1.5–7.7)
NEUTROPHILS NFR BLD AUTO: 63.6 %
NONHDLC SERPL-MCNC: 138 MG/DL (ref ?–130)
OSMOLALITY SERPL CALC.SUM OF ELEC: 298 MOSM/KG (ref 275–295)
PLATELET # BLD AUTO: 184 10(3)UL (ref 150–450)
POTASSIUM SERPL-SCNC: 3.5 MMOL/L (ref 3.5–5.1)
PROT SERPL-MCNC: 6.7 G/DL (ref 6.4–8.2)
PSA SERPL-MCNC: 4.61 NG/ML (ref ?–4)
RBC # BLD AUTO: 4.89 X10(6)UL
SODIUM SERPL-SCNC: 143 MMOL/L (ref 136–145)
TRIGL SERPL-MCNC: 146 MG/DL (ref 30–149)
VLDLC SERPL CALC-MCNC: 25 MG/DL (ref 0–30)
WBC # BLD AUTO: 6.1 X10(3) UL (ref 4–11)

## 2022-07-28 PROCEDURE — 3074F SYST BP LT 130 MM HG: CPT | Performed by: INTERNAL MEDICINE

## 2022-07-28 PROCEDURE — 36415 COLL VENOUS BLD VENIPUNCTURE: CPT

## 2022-07-28 PROCEDURE — 3079F DIAST BP 80-89 MM HG: CPT | Performed by: INTERNAL MEDICINE

## 2022-07-28 PROCEDURE — 83036 HEMOGLOBIN GLYCOSYLATED A1C: CPT

## 2022-07-28 PROCEDURE — 3008F BODY MASS INDEX DOCD: CPT | Performed by: INTERNAL MEDICINE

## 2022-07-28 PROCEDURE — 80061 LIPID PANEL: CPT

## 2022-07-28 PROCEDURE — 84153 ASSAY OF PSA TOTAL: CPT

## 2022-07-28 PROCEDURE — 90471 IMMUNIZATION ADMIN: CPT | Performed by: INTERNAL MEDICINE

## 2022-07-28 PROCEDURE — 85025 COMPLETE CBC W/AUTO DIFF WBC: CPT | Performed by: INTERNAL MEDICINE

## 2022-07-28 PROCEDURE — 90750 HZV VACC RECOMBINANT IM: CPT | Performed by: INTERNAL MEDICINE

## 2022-07-28 PROCEDURE — 80053 COMPREHEN METABOLIC PANEL: CPT

## 2022-07-28 PROCEDURE — 99214 OFFICE O/P EST MOD 30 MIN: CPT | Performed by: INTERNAL MEDICINE

## 2022-07-28 RX ORDER — TAMSULOSIN HYDROCHLORIDE 0.4 MG/1
0.4 CAPSULE ORAL DAILY
Qty: 90 CAPSULE | Refills: 3 | Status: SHIPPED | OUTPATIENT
Start: 2022-07-28

## 2022-07-28 RX ORDER — SENNA PLUS 8.6 MG/1
1 TABLET ORAL 2 TIMES DAILY PRN
Qty: 60 TABLET | Refills: 2 | Status: SHIPPED | OUTPATIENT
Start: 2022-07-28

## 2022-08-09 ENCOUNTER — TELEPHONE (OUTPATIENT)
Dept: INTERNAL MEDICINE CLINIC | Facility: CLINIC | Age: 62
End: 2022-08-09

## 2022-08-09 NOTE — TELEPHONE ENCOUNTER
----- Message from Rafat Alford MD sent at 8/2/2022 11:08 AM CDT -----  Please inform pt that labs were reviewed. His PSA increased compared to previous. Not overall significant increase but with his history of prostate cancer I recommend follow up with his urologist as soon as possible. I provided him with referral during visit. Lipid panel with elevated LDL. Increase consumption of vegetables, fruits, herbs, nuts, beans and whole grains. Decrease intake of saturated fats, processed foods, meats and sweets. Moderate amounts of dairy, poultry and seafood. His ASCVD risk score is above 7%. I recommend considering initiating statin. If he agrees can start on 20 mg of atorvastatin nightly. Send 90 tabs.   Rest of labs normal.        Alejandro Chambers MD

## 2022-08-12 RX ORDER — ATORVASTATIN CALCIUM 20 MG/1
20 TABLET, FILM COATED ORAL NIGHTLY
Qty: 90 TABLET | Refills: 0 | Status: SHIPPED | OUTPATIENT
Start: 2022-08-12

## 2022-08-12 NOTE — TELEPHONE ENCOUNTER
Called patient back in regards to lab results.  was verified and results were given. Patient agree to start stain medication. [facility] Patient has the referral for urologist already and will follow-up with them.

## 2023-10-01 DIAGNOSIS — N40.1 BENIGN PROSTATIC HYPERPLASIA WITH NOCTURIA: ICD-10-CM

## 2023-10-01 DIAGNOSIS — R35.1 BENIGN PROSTATIC HYPERPLASIA WITH NOCTURIA: ICD-10-CM

## 2023-10-06 RX ORDER — TAMSULOSIN HYDROCHLORIDE 0.4 MG/1
0.4 CAPSULE ORAL DAILY
Qty: 90 CAPSULE | Refills: 3 | OUTPATIENT
Start: 2023-10-06

## 2023-11-02 ENCOUNTER — OFFICE VISIT (OUTPATIENT)
Dept: INTERNAL MEDICINE CLINIC | Facility: CLINIC | Age: 63
End: 2023-11-02
Payer: COMMERCIAL

## 2023-11-02 VITALS
HEIGHT: 68.25 IN | DIASTOLIC BLOOD PRESSURE: 70 MMHG | BODY MASS INDEX: 26.22 KG/M2 | SYSTOLIC BLOOD PRESSURE: 122 MMHG | TEMPERATURE: 98 F | OXYGEN SATURATION: 97 % | HEART RATE: 89 BPM | WEIGHT: 173 LBS

## 2023-11-02 DIAGNOSIS — R97.20 ELEVATED PSA: ICD-10-CM

## 2023-11-02 DIAGNOSIS — Z00.00 ANNUAL PHYSICAL EXAM: Primary | ICD-10-CM

## 2023-11-02 PROCEDURE — 3074F SYST BP LT 130 MM HG: CPT | Performed by: INTERNAL MEDICINE

## 2023-11-02 PROCEDURE — 99396 PREV VISIT EST AGE 40-64: CPT | Performed by: INTERNAL MEDICINE

## 2023-11-02 PROCEDURE — 3008F BODY MASS INDEX DOCD: CPT | Performed by: INTERNAL MEDICINE

## 2023-11-02 PROCEDURE — 3078F DIAST BP <80 MM HG: CPT | Performed by: INTERNAL MEDICINE

## 2023-11-09 ENCOUNTER — LAB ENCOUNTER (OUTPATIENT)
Dept: LAB | Facility: HOSPITAL | Age: 63
End: 2023-11-09
Attending: INTERNAL MEDICINE
Payer: COMMERCIAL

## 2023-11-09 DIAGNOSIS — R97.20 ELEVATED PSA: ICD-10-CM

## 2023-11-09 DIAGNOSIS — Z00.00 ANNUAL PHYSICAL EXAM: ICD-10-CM

## 2023-11-09 LAB
ALBUMIN SERPL-MCNC: 4.1 G/DL (ref 3.2–4.8)
ALBUMIN/GLOB SERPL: 1.5 {RATIO} (ref 1–2)
ALP LIVER SERPL-CCNC: 69 U/L
ALT SERPL-CCNC: 25 U/L
ANION GAP SERPL CALC-SCNC: 5 MMOL/L (ref 0–18)
AST SERPL-CCNC: 20 U/L (ref ?–34)
BASOPHILS # BLD AUTO: 0.06 X10(3) UL (ref 0–0.2)
BASOPHILS NFR BLD AUTO: 1 %
BILIRUB SERPL-MCNC: 1 MG/DL (ref 0.2–1.1)
BUN BLD-MCNC: 16 MG/DL (ref 9–23)
BUN/CREAT SERPL: 12.3 (ref 10–20)
CALCIUM BLD-MCNC: 10 MG/DL (ref 8.7–10.4)
CHLORIDE SERPL-SCNC: 108 MMOL/L (ref 98–112)
CHOLEST SERPL-MCNC: 198 MG/DL (ref ?–200)
CO2 SERPL-SCNC: 29 MMOL/L (ref 21–32)
CREAT BLD-MCNC: 1.3 MG/DL
DEPRECATED RDW RBC AUTO: 43.8 FL (ref 35.1–46.3)
EGFRCR SERPLBLD CKD-EPI 2021: 63 ML/MIN/1.73M2 (ref 60–?)
EOSINOPHIL # BLD AUTO: 0.22 X10(3) UL (ref 0–0.7)
EOSINOPHIL NFR BLD AUTO: 3.5 %
ERYTHROCYTE [DISTWIDTH] IN BLOOD BY AUTOMATED COUNT: 12.3 % (ref 11–15)
EST. AVERAGE GLUCOSE BLD GHB EST-MCNC: 108 MG/DL (ref 68–126)
FASTING PATIENT LIPID ANSWER: YES
FASTING STATUS PATIENT QL REPORTED: YES
GLOBULIN PLAS-MCNC: 2.8 G/DL (ref 2.8–4.4)
GLUCOSE BLD-MCNC: 83 MG/DL (ref 70–99)
HBA1C MFR BLD: 5.4 % (ref ?–5.7)
HCT VFR BLD AUTO: 48.9 %
HDLC SERPL-MCNC: 56 MG/DL (ref 40–59)
HGB BLD-MCNC: 16.4 G/DL
IMM GRANULOCYTES # BLD AUTO: 0.03 X10(3) UL (ref 0–1)
IMM GRANULOCYTES NFR BLD: 0.5 %
LDLC SERPL CALC-MCNC: 114 MG/DL (ref ?–100)
LYMPHOCYTES # BLD AUTO: 1.82 X10(3) UL (ref 1–4)
LYMPHOCYTES NFR BLD AUTO: 29 %
MCH RBC QN AUTO: 32.2 PG (ref 26–34)
MCHC RBC AUTO-ENTMCNC: 33.5 G/DL (ref 31–37)
MCV RBC AUTO: 96.1 FL
MONOCYTES # BLD AUTO: 0.46 X10(3) UL (ref 0.1–1)
MONOCYTES NFR BLD AUTO: 7.3 %
NEUTROPHILS # BLD AUTO: 3.68 X10 (3) UL (ref 1.5–7.7)
NEUTROPHILS # BLD AUTO: 3.68 X10(3) UL (ref 1.5–7.7)
NEUTROPHILS NFR BLD AUTO: 58.7 %
NONHDLC SERPL-MCNC: 142 MG/DL (ref ?–130)
OSMOLALITY SERPL CALC.SUM OF ELEC: 294 MOSM/KG (ref 275–295)
PLATELET # BLD AUTO: 207 10(3)UL (ref 150–450)
POTASSIUM SERPL-SCNC: 3.9 MMOL/L (ref 3.5–5.1)
PROT SERPL-MCNC: 6.9 G/DL (ref 5.7–8.2)
PSA FREE MFR SERPL: 16 %
PSA FREE SERPL-MCNC: 0.85 NG/ML
PSA SERPL-MCNC: 5.46 NG/ML (ref ?–4)
RBC # BLD AUTO: 5.09 X10(6)UL
SODIUM SERPL-SCNC: 142 MMOL/L (ref 136–145)
TRIGL SERPL-MCNC: 158 MG/DL (ref 30–149)
VLDLC SERPL CALC-MCNC: 27 MG/DL (ref 0–30)
WBC # BLD AUTO: 6.3 X10(3) UL (ref 4–11)

## 2023-11-09 PROCEDURE — 83036 HEMOGLOBIN GLYCOSYLATED A1C: CPT | Performed by: INTERNAL MEDICINE

## 2023-11-09 PROCEDURE — 84154 ASSAY OF PSA FREE: CPT | Performed by: INTERNAL MEDICINE

## 2023-11-09 PROCEDURE — 84153 ASSAY OF PSA TOTAL: CPT | Performed by: INTERNAL MEDICINE

## 2023-11-09 PROCEDURE — 85025 COMPLETE CBC W/AUTO DIFF WBC: CPT | Performed by: INTERNAL MEDICINE

## 2023-11-09 PROCEDURE — 80061 LIPID PANEL: CPT | Performed by: INTERNAL MEDICINE

## 2023-11-09 PROCEDURE — 80053 COMPREHEN METABOLIC PANEL: CPT | Performed by: INTERNAL MEDICINE

## 2023-11-30 ENCOUNTER — TELEPHONE (OUTPATIENT)
Dept: INTERNAL MEDICINE CLINIC | Facility: CLINIC | Age: 63
End: 2023-11-30

## 2023-11-30 RX ORDER — ATORVASTATIN CALCIUM 10 MG/1
10 TABLET, FILM COATED ORAL NIGHTLY
Qty: 90 TABLET | Refills: 1 | Status: SHIPPED | OUTPATIENT
Start: 2023-11-30

## 2023-11-30 NOTE — TELEPHONE ENCOUNTER
Patient was contacted and results discussed, he is agreeable to start cholesterol medication, Rx sent in. PSA results were dicussed and # to urologist was provided, he's aware he needs to be seen in 6 months and that labs have to be done prior to his visit.        ----- Message from Rachael Crouch MD sent at 11/28/2023  9:33 AM CST -----  Please inform patient that his labs have been reviewed. His lipid panel overall is stable. LDL is still above goal.    I recommend starting a cholesterol medication. If he agrees please send atorvastatin 10 mg to be taken nightly. 90 tabs with 1 refill is okay. His PSA level is slowly rising compared to previous. It is recommended that he continue to follow-up with urology. He was given a referral previously. Please strongly encourage that he set up appointment. New referral has been entered. The rest of his labs are normal.  Should follow up in 6 months Labs ordered to be done prior to visit placed.       PQ

## 2024-01-21 DIAGNOSIS — N40.1 BENIGN PROSTATIC HYPERPLASIA WITH NOCTURIA: ICD-10-CM

## 2024-01-21 DIAGNOSIS — R35.1 BENIGN PROSTATIC HYPERPLASIA WITH NOCTURIA: ICD-10-CM

## 2024-01-22 RX ORDER — TAMSULOSIN HYDROCHLORIDE 0.4 MG/1
0.4 CAPSULE ORAL DAILY
Qty: 90 CAPSULE | Refills: 0 | Status: SHIPPED | OUTPATIENT
Start: 2024-01-22

## 2024-01-25 ENCOUNTER — OFFICE VISIT (OUTPATIENT)
Dept: SURGERY | Facility: CLINIC | Age: 64
End: 2024-01-25

## 2024-01-25 VITALS
HEIGHT: 68 IN | WEIGHT: 173 LBS | HEART RATE: 82 BPM | BODY MASS INDEX: 26.22 KG/M2 | SYSTOLIC BLOOD PRESSURE: 150 MMHG | DIASTOLIC BLOOD PRESSURE: 89 MMHG

## 2024-01-25 DIAGNOSIS — N40.1 BPH WITH OBSTRUCTION/LOWER URINARY TRACT SYMPTOMS: ICD-10-CM

## 2024-01-25 DIAGNOSIS — R35.1 BENIGN PROSTATIC HYPERPLASIA WITH NOCTURIA: ICD-10-CM

## 2024-01-25 DIAGNOSIS — N40.1 BENIGN PROSTATIC HYPERPLASIA WITH NOCTURIA: ICD-10-CM

## 2024-01-25 DIAGNOSIS — N13.8 BPH WITH OBSTRUCTION/LOWER URINARY TRACT SYMPTOMS: ICD-10-CM

## 2024-01-25 DIAGNOSIS — C61 PROSTATE CANCER (HCC): Primary | ICD-10-CM

## 2024-01-25 PROCEDURE — 3079F DIAST BP 80-89 MM HG: CPT | Performed by: UROLOGY

## 2024-01-25 PROCEDURE — 3077F SYST BP >= 140 MM HG: CPT | Performed by: UROLOGY

## 2024-01-25 PROCEDURE — 99204 OFFICE O/P NEW MOD 45 MIN: CPT | Performed by: UROLOGY

## 2024-01-25 PROCEDURE — 3008F BODY MASS INDEX DOCD: CPT | Performed by: UROLOGY

## 2024-01-25 RX ORDER — TAMSULOSIN HYDROCHLORIDE 0.4 MG/1
0.4 CAPSULE ORAL DAILY
Qty: 90 CAPSULE | Refills: 3 | Status: SHIPPED | OUTPATIENT
Start: 2024-01-25

## 2024-01-25 NOTE — PROGRESS NOTES
[facility] Urology  Initial Office Consultation    HPI:   Alex Hannon is a 63 year old male here today for consultation at the request of, and a copy of this note will be sent to, Angel Neumann MD. Language line  utilized for this visit.  ID [id number].    1.  Very low risk prostate Cancer (cT1c Nx Mx)  2. BPH with LUTS  Previous patient of Dr. CORONEL.     No family history of prostate cancer.  Elevated screening PSA level up to 4.2 ng/mL 4/2019.    Diagnosed with lowe risk prostate cancer by Dr. CORONEL upon TRUS-guided prostate biopsy 8/2019.   - His prostate was noted to be enlarged with a calculated volume of 80 mL on TRUS.  - His initial PSA was 4.2 ng/mL.   - Pathology revealed GG1 (sonal 3+3=6) PCa in 1/14 cores in left apex with 1% involvement.     Patient elected active surveillance however has not followed up since his initial diagnosis.  He has not undergone a confirmatory prostate biopsy.  He has not had a prostate MRI.    His PSA levels have been checked by his primary care physician and have been rising lately.  His PSA 11/9/2023 increased to 5.46 ng/mL 60% free PSA.    He also has BPH with LUTS.  He has been on tamsulosin since 2019.  He reports stable LUTS.  His IPSS today is 12 (4/2/0/2/2/0/2) with a quality-of-life score of 1.    He reports sensation of incomplete bladder emptying, nocturia x 2, occasional weak stream.  No straining to urinate or intermittency.    No gross hematuria, dysuria unintentional weight loss, or fevers.    No significant erectile dysfunction.    - 1/2024 consult: Bladder scan PVR 15 mL.  Continue Flomax.  Obtain prostate MRI followed by confirmatory biopsy.      PAST MEDICAL HISTORY: Prostate cancer 6/2019.  Hyperlipidemia.  BPH.    PAST SURGICAL HISTORY: Right knee surgery.    SOCIAL HISTORY:  and has 3 children.  No smoking or illicit drug use.  No alcohol periods Works at a factory.    Family History   Problem Relation Age  of Onset    No Known Problems Father     Hypertension Mother     No Known Problems Son     Hypertension Sister     No Known Problems Son     No Known Problems Son     Hypertension Sister     Hypertension Sister     No Known Problems Sister     No Known Problems Sister      Allergies: Patient has no known allergies.      REVIEW OF SYSTEMS:  Pertinent positives and negatives per HPI. A 12-point ROS was performed and is otherwise negative.       EXAM:  /89 (BP Location: Right arm, Patient Position: Sitting, Cuff Size: adult)   Pulse 82   Ht 5' 8\" (1.727 m)   Wt 173 lb (78.5 kg)   BMI 26.30 kg/m²     Physical Exam  Constitutional:       General: He is not in acute distress.     Appearance: He is well-developed.   HENT:      Head: Normocephalic.   Eyes:      General: No scleral icterus.  Cardiovascular:      Rate and Rhythm: Normal rate.   Pulmonary:      Effort: Pulmonary effort is normal.   Abdominal:      General: There is no distension.      Palpations: Abdomen is soft.      Tenderness: There is no abdominal tenderness.   Genitourinary:     Penis: Uncircumcised.       Testes: Normal.      Epididymis:      Right: Normal.      Left: Normal.      Comments: JANET revealing a 3+ enlarged prostate, smooth, nontender, nonnodular, symmetrical.  Skin:     General: Skin is warm and dry.   Neurological:      Mental Status: He is alert and oriented to person, place, and time.   Psychiatric:         Mood and Affect: Mood normal.         Behavior: Behavior normal.       LABS:     PSA, % Free PSA   Latest Ref Rng % <=4.00 ng/mL   10/26/2016  3.3    1/25/2018  3.5    4/2/2019  4.20 (H)   11/2/2021  3.15   7/28/2022  4.61 (H)    11/9/2023 16  5.46 (H)       Legend:  (H) High      IMAGING:  No results found.      IMPRESSION:  63 year old  male who was diagnosed with very low risk prostate cancer upon prostate biopsy performed 6/2019.      Elected active surveillance however has been on watchful waiting.    PSA levels  have been slowly rising, now 5.46 ng/mL 11/2023.    Also with BPH on medical therapy with tamsulosin with stable LUTS.    Findings reviewed with patient at length.  We discussed relevant anatomy and physiology.    Regarding prostate cancer: Discussed natural disease history.  Discussed management options for patients with low risk disease including active surveillance, brachytherapy, EBRT, or radical prostatectomy.    Patient has previously chosen active surveillance however has not been following up since then.  His PSA levels have been slightly rising.  He is interested in continuing active surveillance as possible.    At this point I recommend obtaining a multiparametric MRI of the prostate followed by a confirmatory TRUS-guided prostate biopsy (standard sextant versus targeted depending on MRI results).    Prostate biopsy discussed including rationale, approach, benefits, risks, and alternatives.  We discussed risks including bleeding, infection, urinary retention, damage to surrounding organs or structures.    Patient agreeable.    Regarding his BPH: Symptoms are improved and stable on tamsulosin.  Discussed continuing current management.  Patient agreeable.    All questions answered.      PLAN:  1.  Obtain multiparametric MRI of the prostate.  Depending on results, patient will be scheduled for either a standard sextant or UroNav MRI-US fusion guided confirmatory TRUS-guided prostate biopsy.    2.  Continue tamsulosin for management of BPH with LUTS.    Natan Villalpando MD  1/25/2024

## 2024-03-16 ENCOUNTER — HOSPITAL ENCOUNTER (OUTPATIENT)
Dept: MRI IMAGING | Facility: HOSPITAL | Age: 64
Discharge: HOME | End: 2024-03-16
Attending: UROLOGY
Payer: COMMERCIAL

## 2024-03-16 DIAGNOSIS — C61 PROSTATE CANCER (HCC): ICD-10-CM

## 2024-03-16 PROCEDURE — 72197 MRI PELVIS W/O & W/DYE: CPT | Performed by: UROLOGY

## 2024-03-16 PROCEDURE — A9575 INJ GADOTERATE MEGLUMI 0.1ML: HCPCS | Performed by: UROLOGY

## 2024-03-16 RX ORDER — GADOTERATE MEGLUMINE 376.9 MG/ML
15 INJECTION INTRAVENOUS
Status: COMPLETED | OUTPATIENT
Start: 2024-03-16 | End: 2024-03-16

## 2024-03-16 RX ADMIN — GADOTERATE MEGLUMINE 15 ML: 376.9 INJECTION INTRAVENOUS at 09:35:00

## 2024-03-21 ENCOUNTER — TELEPHONE (OUTPATIENT)
Dept: SURGERY | Facility: CLINIC | Age: 64
End: 2024-03-21

## 2024-03-21 DIAGNOSIS — C61 PROSTATE CANCER (HCC): Primary | ICD-10-CM

## 2024-03-21 NOTE — TELEPHONE ENCOUNTER
I called pt with the assistance of [place] interpretor Jairo, ID # [id number] and I informed him of the results and instructions in [facility]'s msg as stated below and pt verbalized understanding and agreed to schd the prostate bx procedure for Wed. 4/10 at 9 am and I asked that he arrive at 8:30 am and I told him that we will send the 2 abx to his pharmacy and he should also p/u a 4.5 oz fleet enema when he picks up the abx's and I explained how to administer it. I also told pt that he must hold all asa products for 10 days prior. Pt does not have anyone at home who reads and understands english and pr asked if he could stop into the office on Monday at 11 am for this instructions again in [place]. I told him that was fine.

## 2024-03-21 NOTE — TELEPHONE ENCOUNTER
----- Message from Ebony Cash PA-C sent at 3/18/2024  4:00 PM CDT -----  I called patient and left message to discuss results.   Patient's MRI is PI-RADS 4, no lesion that can be biopsied, there are poorly defined potential lesions. Will need a traditional biopsy with [facility]. If agreeable, please schedule him for biopsy with [facility].   Thank you,   Ebony

## 2024-03-25 ENCOUNTER — NURSE ONLY (OUTPATIENT)
Dept: SURGERY | Facility: CLINIC | Age: 64
End: 2024-03-25

## 2024-03-25 DIAGNOSIS — C61 PROSTATE CANCER (HCC): Primary | ICD-10-CM

## 2024-03-25 NOTE — PROGRESS NOTES
Pt came in to the office to review the pre prostate biopsy instructions. I again went over all the instructions with pt in Czech and pt verbalized understanding.

## 2024-03-27 RX ORDER — CEFDINIR 300 MG/1
CAPSULE ORAL
Qty: 2 CAPSULE | Refills: 0 | Status: SHIPPED | OUTPATIENT
Start: 2024-03-27 | End: 2024-05-01

## 2024-03-27 RX ORDER — CIPROFLOXACIN 500 MG/1
TABLET, FILM COATED ORAL
Qty: 2 TABLET | Refills: 0 | Status: SHIPPED | OUTPATIENT
Start: 2024-03-27 | End: 2024-05-01

## 2024-04-03 ENCOUNTER — TELEPHONE (OUTPATIENT)
Dept: SURGERY | Facility: CLINIC | Age: 64
End: 2024-04-03

## 2024-04-03 NOTE — TELEPHONE ENCOUNTER
- pt scheduled for prostate bx on 4/10/24, needs to be rescheduled, [facility] not going to be in office.

## 2024-04-05 NOTE — TELEPHONE ENCOUNTER
will be out of the office on 04/10/2024. Spoke with pt and I assisted in rescheduling appt for 04/24/24 at 09:00am and pt agreed. Again reminded pt to arrive 30 min early and pt verbalized understanding.

## 2024-04-24 ENCOUNTER — PROCEDURE (OUTPATIENT)
Dept: SURGERY | Facility: CLINIC | Age: 64
End: 2024-04-24

## 2024-04-24 DIAGNOSIS — C61 PROSTATIC ADENOCARCINOMA (HCC): ICD-10-CM

## 2024-04-24 DIAGNOSIS — R97.20 ELEVATED PSA: Primary | ICD-10-CM

## 2024-04-24 PROCEDURE — 76942 ECHO GUIDE FOR BIOPSY: CPT | Performed by: UROLOGY

## 2024-04-24 PROCEDURE — 55700 BIOPSY OF PROSTATE,NEEDLE/PUNCH: CPT | Performed by: UROLOGY

## 2024-04-24 NOTE — PROCEDURES
[facility] Urology  Procedure Note  TRUS-Guided Prostate Needle Biopsy     Alex Hannon Patient Status:  Specimen    3/2/1960 MRN [id number]   Location [facility] Attending Natan Villalpando MD   Hosp Day # 0 PCP Angel Neumann MD     Alex Hannon is a 64 year old male.  Diagnosed with low risk prostate cancer .  On active surveillance however did not undergo a surveillance biopsy. He was recommended to undergo a TRUS-guided prostate needle biopsy for surveillance of his prostate cancer. All Risks, benefits and alternatives of the procedure were previously explained to the patient and he provided informed consent.      PRE-OP:   Elevated screening PSA level (5.46 ng/mL)    POST OP:   Elevated screening PSA level (5.46 ng/mL)    PROCEDURE:   Transrectal ultrasound of the prostate; ultrasound guidance of biopsy; transrectal needle biopsy of the prostate; administration of ayde-prostatic nerve block.    ANESTHESIA:   1 % Xylocaine solution ayde-prostatic nerve block.    FINDING:   Seminal vesicles: WNL   Hypoechoic prostate lesions suspicious for malignancy: Not appreciated.    Presence of intravesical median lobe: Yes. Significant.   Prostate volume on ultrasound: 62.48 cc (W 5 cm x H 3.4 cm x L 6.8 cm)  Number of biopsies on right side: 7 (including 1 from right anterior TZ)  Number of biopsies on left side: 7 (including 1 from left anterior TZ)  PSA-Density: 0.09 ng/mL/cc.     DESCRIPTION OF PROCEDURE:   Transrectal ultrasonography of the prostate and seminal vesicles was performed throughout the entirety of both of these glands in the transverse ad sagittal planes using a [employer] ultrasound System, and a 8 MHZ endorectal probe. Representative pictures were taken of the base, mid-aspect, and apical aspects for the prostate gland and these were preserved on hard copy. Pathology was noted. Prostate volume was determined by imaging the prostate in the transverse  and sagittal planes and determining maximum height, width and length dimensions. The decision was then made to proceed with prostate biopsy under ultrasound guidance. Using a 7\" 22 gauge spinal needle and using ultrasound guidance, I injected  1% xylocaine solution in each of the following 2 locations: Junction of the prostate and seminal vesicle at the right base; junction of the prostate and seminal vesicle at the left base. After the infiltrations were performed, using a [employer] Max Core 18 G disposable biopsy instrument cores were obtained from the medial and lateral aspects of each lobe from the peripheral zone at the base, mid-aspect and apical aspects of the gland. The biopsies were performed with the aid of ultrasound imaging in the transverse and sagittal planes. Good cores were obtained and these were sent to Pathology in formalin for examination. The patient tolerated the procedure well. He voided without difficulty before leaving the office. He is cautioned that his urine, stool, and semen may all show evidence of blood for the time being. Furthermore, he will finish his antibiotics as instructed.    F/U in 2 weeks for pathology discussion.     Natan Villalpando MD  04/24/24

## 2024-04-26 ENCOUNTER — TELEPHONE (OUTPATIENT)
Dept: SURGERY | Facility: CLINIC | Age: 64
End: 2024-04-26

## 2024-04-26 NOTE — TELEPHONE ENCOUNTER
----- Message from Natan Villalpando MD sent at 4/26/2024  7:35 AM CDT -----  Results reviewed. Tests show no significant abnormalities. Please inform patient that his prostate biopsy was negative, NO evidence of prostate cancer. Follow-up as scheduled 5/1/24.

## 2024-04-29 NOTE — TELEPHONE ENCOUNTER
Tried to reach pt with the assistance of [place] interpretor  Mirtha, ID # [id number] on the mobile/hm # and had to LMTCB again. Tried the son and

## 2024-05-01 ENCOUNTER — OFFICE VISIT (OUTPATIENT)
Dept: SURGERY | Facility: CLINIC | Age: 64
End: 2024-05-01

## 2024-05-01 DIAGNOSIS — N40.1 BENIGN PROSTATIC HYPERPLASIA WITH NOCTURIA: ICD-10-CM

## 2024-05-01 DIAGNOSIS — R35.1 BENIGN PROSTATIC HYPERPLASIA WITH NOCTURIA: ICD-10-CM

## 2024-05-01 DIAGNOSIS — C61 PROSTATE CANCER (HCC): Primary | ICD-10-CM

## 2024-05-01 DIAGNOSIS — N52.9 ERECTILE DYSFUNCTION, UNSPECIFIED ERECTILE DYSFUNCTION TYPE: ICD-10-CM

## 2024-05-01 PROCEDURE — 99214 OFFICE O/P EST MOD 30 MIN: CPT | Performed by: UROLOGY

## 2024-05-01 RX ORDER — TADALAFIL 20 MG/1
TABLET ORAL
Qty: 10 TABLET | Refills: 11 | Status: SHIPPED | OUTPATIENT
Start: 2024-05-01

## 2024-05-01 NOTE — PROGRESS NOTES
[facility] Urology  Follow-Up Visit    HPI: Alex Hannon is a 64 year old male presents for a follow up visit. Patient was last seen on 1/25/24.  Language line  utilized for this visit.   ID [id number].    INTERVAL HISTORY: Very low risk prostate cancer diagnosed 8/2019.    On active surveillance but did not have confirmatory biopsy.    Rising PSA levels to 5.46 ng/mL 11/2023.    Prostate MRI 3/2024 revealing an enlarged prostate with a calculated volume of 91 cc.  PI-RADS 4 study with a poorly defined potential lesions in the anterior TZ, difficult to differentiate from the anterior fibromuscular stroma.    He underwent a standard TRUS-guided prostate biopsy on 4/24/2024.  Pathology was benign.    Also with BPH, on tamsulosin 0.4 mg daily with stable LUTS.  His IPSS is 6 with a quality-of-life score of 1.    PVR 3 mL.    Patient would also like to discuss erectile dysfunction.  He has difficulty obtaining maintaining erections.  Normal libido.  Did not try any treatments.    1. Very low risk prostate Cancer (cT1c Nx Mx)  2. BPH with LUTS  3. ED  Previous patient of Dr. CORONEL.      No family history of prostate cancer.  Elevated screening PSA level up to 4.2 ng/mL 4/2019.     Diagnosed with lowe risk prostate cancer by Dr. CORONEL upon TRUS-guided prostate biopsy 8/2019.   - His prostate was noted to be enlarged with a calculated volume of 80 mL on TRUS.  - His initial PSA was 4.2 ng/mL.   - Pathology revealed GG1 (sonal 3+3=6) PCa in 1/14 cores in left apex with 1% involvement.      Patient elected active surveillance however has not followed up since his initial diagnosis.  He has not undergone a confirmatory prostate biopsy.  He has not had a prostate MRI.     His PSA levels have been checked by his primary care physician and have been rising lately.  His PSA 11/9/2023 increased to 5.46 ng/mL 60% free PSA.     He also has BPH with LUTS.  He has been on tamsulosin  since 2019.  He reports stable LUTS.  His IPSS today is 12 (4/2/0/2/2/0/2) with a quality-of-life score of 1.     He reports sensation of incomplete bladder emptying, nocturia x 2, occasional weak stream.  No straining to urinate or intermittency.     No gross hematuria, dysuria unintentional weight loss, or fevers.     No significant erectile dysfunction.     - 1/2024 consult: Bladder scan PVR 15 mL.  Continue Flomax.  Obtain prostate MRI followed by confirmatory biopsy.    Prostate MRI 3/2024 revealing an enlarged prostate with a calculated volume of 91 cc.  PI-RADS 4 study with a poorly defined potential lesions in the anterior TZ, difficult to differentiate from the anterior fibromuscular stroma.    - 4/24/2024: TRUS-guided prostate biopsy. Pathology benign.       PAST MEDICAL HISTORY: Prostate cancer 6/2019.  Hyperlipidemia.  BPH.     PAST SURGICAL HISTORY: Right knee surgery.     SOCIAL HISTORY:  and has 3 children.  No smoking or illicit drug use.  No alcohol periods Works at a factory.    Reviewed past medical, surgical, family, and social history.  Reviewed med list and allergies.      REVIEW OF SYSTEMS:  Pertinent positives and negatives per HPI. A 10-point ROS was performed and is otherwise negative.       EXAM:  There were no vitals taken for this visit.    Physical Exam  Constitutional:       Appearance: He is well-developed.   HENT:      Head: Normocephalic.   Eyes:      General: No scleral icterus.  Cardiovascular:      Rate and Rhythm: Normal rate.   Pulmonary:      Effort: Pulmonary effort is normal.   Skin:     General: Skin is warm and dry.   Neurological:      Mental Status: He is alert and oriented to person, place, and time.   Psychiatric:         Mood and Affect: Mood normal.         Behavior: Behavior normal.       PATHOLOGY:  4/2024 TRUS-guided prostate biopsy: Benign without evidence of malignancy in 14 examined cores.      LABS:      PSA, % Free PSA    Latest Ref Rng % <=4.00 ng/mL    10/26/2016   3.3    1/25/2018   3.5    4/2/2019   4.20 (H)   11/2/2021   3.15   7/28/2022   4.61 (H)    11/9/2023 16  5.46 (H)        IMAGING:    MRI PROSTATE (3/17/2024): 1. PI-RADS CLASSIFICATION:  PI-RADS 4 - High (clinically significant cancer is likely to be present).  2. There are poorly defined potential lesions of the anterior transition zone, difficult to differentiate from the anterior fibromuscular stroma.  3. No elevation of PSA density.  4. Negative for discernible intrapelvic lymphadenopathy.  5. No suspicious osseous lesions are identified in the imaged volume.  6. Lesser incidental findings as above.       UROLOGY PROCEDURE:  Not performed today.      IMPRESSION:  64 year old  male who was diagnosed with very low risk prostate cancer upon prostate biopsy performed 6/2019.       Elected active surveillance however has been on watchful waiting.     PSA levels have been slowly rising, now 5.46 ng/mL 11/2023.     Also with BPH on medical therapy with tamsulosin with stable LUTS.    Recent confirmatory TRUS-guided prostate biopsy was negative.    Prostate MRI reported PI-RADS 4 category with ill-defined potential lesions in the TZ.    Findings reviewed with patient at length.  Reviewed pathology results.  Recommend continued PSA monitoring with repeat in 6 months.  If PSA levels are rising, would consider a UroNav MRI-US fusion targeted biopsy of the anterior TZ lesions.    Patient agreeable.    Regarding his BPH: Stable mild LUTS on medical therapy with tamsulosin.  Benefits, risks, alternatives discussed.  Continue current management.    Patient reporting erectile dysfunction.  Prescription for generic 5 PDE inhibitor provided.  Discussed benefits, risks and side effects.    All questions answered.      PLAN:  1.  Continue active surveillance of low risk prostate cancer.  Repeat PSA in 6 months with follow-up thereafter.    If PSA levels continue to rise, would recommend a UroNav MRI-US fusion  targeted prostate biopsy.    2.  Continue tamsulosin 0.4 mg daily for BPH with LUTS.    3.  Trial of tadalafil 10 to 20 mg as needed for ED.    Follow-up in 6 months with a PSA before office visit.      Natan Villalpando MD  5/1/2024

## 2024-12-03 ENCOUNTER — TELEPHONE (OUTPATIENT)
Dept: SURGERY | Facility: CLINIC | Age: 64
End: 2024-12-03

## 2025-06-01 DIAGNOSIS — N40.1 BENIGN PROSTATIC HYPERPLASIA WITH NOCTURIA: ICD-10-CM

## 2025-06-01 DIAGNOSIS — R35.1 BENIGN PROSTATIC HYPERPLASIA WITH NOCTURIA: ICD-10-CM

## 2025-06-02 RX ORDER — TAMSULOSIN HYDROCHLORIDE 0.4 MG/1
CAPSULE ORAL
Qty: 90 CAPSULE | Refills: 3 | OUTPATIENT
Start: 2025-06-02

## 2025-06-02 NOTE — TELEPHONE ENCOUNTER
- pt LOV 5/1/24, no appts scheduled with our providers, protocol not met, refill denied with reason  No future appointments.

## 2025-06-16 DIAGNOSIS — N40.1 BENIGN PROSTATIC HYPERPLASIA WITH NOCTURIA: ICD-10-CM

## 2025-06-16 DIAGNOSIS — R35.1 BENIGN PROSTATIC HYPERPLASIA WITH NOCTURIA: ICD-10-CM

## 2025-06-16 RX ORDER — TAMSULOSIN HYDROCHLORIDE 0.4 MG/1
CAPSULE ORAL
Qty: 90 CAPSULE | Refills: 3 | OUTPATIENT
Start: 2025-06-16

## (undated) DEVICE — ZIMMER® STERILE DISPOSABLE TOURNIQUET CUFF WITH PLC, DUAL PORT, SINGLE BLADDER, 34 IN. (86 CM)

## (undated) DEVICE — OCCLUSIVE GAUZE STRIP,3% BISMUTH TRIBROMOPHENATE IN PETROLATUM BLEND: Brand: XEROFORM

## (undated) DEVICE — SUTURE ETHILON 3-0 669H

## (undated) DEVICE — SOL  .9 3000ML

## (undated) DEVICE — SOCK CNSTR 4IN TNPSL UNV SPEC

## (undated) DEVICE — DISSECTOR ESCP COOLCUT 4MM CRV

## (undated) DEVICE — BANDAGE ROLL,100% COTTON, 6 PLY, LARGE: Brand: KERLIX

## (undated) DEVICE — ARTHROSCOPY: Brand: MEDLINE INDUSTRIES, INC.

## (undated) DEVICE — BLADE SHVR COOLCUT 13CM 4MM

## (undated) DEVICE — AMBIENT SUPER TURBOVAC 90 IFS: Brand: COBLATION

## (undated) DEVICE — COTTON UNDERCAST PADDING,REGULAR FINISH: Brand: WEBRIL

## (undated) DEVICE — TRAY SRGPRP PVP IOD WT SCRB SM

## (undated) DEVICE — NEEDLE HPO 18GA 1.5IN ECLPS

## (undated) DEVICE — COVER SGL STRL LGHT HNDL BLU

## (undated) DEVICE — PADDING 4YDX6IN CTTN STRL WBRL

## (undated) DEVICE — GAUZE SPONGES,12 PLY: Brand: CURITY

## (undated) DEVICE — BANDAGE FLXMSTR 11YDX6IN STRL

## (undated) DEVICE — SOLUTION SURG DURA PREP HAZMAT

## (undated) DEVICE — TUBING IRR 16FT CNT WV 3 ASCP

## (undated) DEVICE — STERILE POLYISOPRENE POWDER-FREE SURGICAL GLOVES: Brand: PROTEXIS

## (undated) DEVICE — SUCTION CANISTER, 3000CC,SAFELINER: Brand: DEROYAL

## (undated) DEVICE — STERILE LATEX POWDER-FREE SURGICAL GLOVES WITH HYDROGEL COATING, SMOOTH FINISH, STRAIGHT FINGER: Brand: PROTEXIS

## (undated) DEVICE — 3M™ STERI-DRAPE™ U-DRAPE 1015: Brand: STERI-DRAPE™

## (undated) DEVICE — SOL  .9 1000ML BTL

## (undated) DEVICE — 3 ML SYRINGE LUER-LOCK TIP: Brand: MONOJECT

## (undated) DEVICE — DECANTER SPIKE TRANSFLOW

## (undated) NOTE — LETTER
Christen Foster, [address]  [address] [facility]  [address] [facility]       06/03/19        Patient: Eleazar Aguilar   YOB: 1960   Date of Visit: 6/3/2019       Dear  Dr. Akanksha Marroquin MD,      Thank you for referring Lila Moncnidy Patient takes tamsulosin 0.4 mg daily. He feels this is stable on tamsulosin and decides to continue as prescribed. RECOMMENDATIONS AND TREATMENT PLAN      1.    Continue Tamsulosin 0.4 mg daily    ------------------------------------------------------

## (undated) NOTE — LETTER
12/03/24        Alex Hannon  [address] [place] [address]      Dear Alex,    Our records indicate that you have outstanding lab work and or testing that was ordered for you and has not yet been completed:  PSA Diagnostic   To provide you with the best possible care, please complete these orders at your earliest convenience. If you have recently completed these orders please disregard this letter.     If you have any questions please call the office at Dept: [phone].     Thank you,       Natan Villalpando MD

## (undated) NOTE — LETTER
No referring provider defined for this encounter.        08/01/19        Patient: Fredy Julian   YOB: 1960   Date of Visit: 8/1/2019       Dear  Dr. Sachi Ritter MD,      I evaluated   Fredy Julian in the office today for about the other treatment options. Patient will return for a visit at the end of November, 2019 with blood draw for PSA before visit.     (N40.1,  R33.8) Benign prostatic hyperplasia with urinary retention  On 6/3/19 exam, prostate enlarged;   Discussed sta

## (undated) NOTE — MR AVS SNAPSHOT
[address] at [facility]  [address]  [facility], [address] [facility] [place]                Thank you for choosing us for your health care visit with Alexandro Aleman MD.  We are glad to serve you and happy to christiano may be held responsible for payment in full if proper authorization is not acquired. Please contact the Patient Business Office at [phone] if you have any questions related to insurance coverage. Thank you.          Reason for Today's Visit     Knee [id number]. Todos los derechos reservados. Esta información no pretende sustituir la atención médica profesional. Sólo schofield médico puede diagnosticar y tratar un problema de leda.              Allergies as of Apr 04, 2017     No Known Allergies                Tod

## (undated) NOTE — LETTER
December 6, 2021      [address] [address]      Dear Harvey Means:    Our office has been trying to contact you to discuss your recent test results. Please contact our office at your earliest convenience at [phone].     Jeffrey Milian